# Patient Record
Sex: FEMALE | Race: OTHER | NOT HISPANIC OR LATINO
[De-identification: names, ages, dates, MRNs, and addresses within clinical notes are randomized per-mention and may not be internally consistent; named-entity substitution may affect disease eponyms.]

---

## 2019-05-21 ENCOUNTER — APPOINTMENT (OUTPATIENT)
Dept: ORTHOPEDIC SURGERY | Facility: CLINIC | Age: 64
End: 2019-05-21
Payer: MEDICAID

## 2019-05-21 VITALS
HEART RATE: 84 BPM | WEIGHT: 140 LBS | SYSTOLIC BLOOD PRESSURE: 138 MMHG | HEIGHT: 62 IN | DIASTOLIC BLOOD PRESSURE: 73 MMHG | BODY MASS INDEX: 25.76 KG/M2

## 2019-05-21 PROCEDURE — 99203 OFFICE O/P NEW LOW 30 MIN: CPT

## 2019-05-21 RX ORDER — DICLOFENAC POTASSIUM 50 MG/1
TABLET, COATED ORAL
Refills: 0 | Status: ACTIVE | COMMUNITY

## 2019-05-21 RX ORDER — PREGABALIN 300 MG/1
CAPSULE ORAL
Refills: 0 | Status: ACTIVE | COMMUNITY

## 2019-08-14 ENCOUNTER — OUTPATIENT (OUTPATIENT)
Dept: OUTPATIENT SERVICES | Facility: HOSPITAL | Age: 64
LOS: 1 days | End: 2019-08-14
Payer: MEDICARE

## 2019-08-14 VITALS
OXYGEN SATURATION: 98 % | WEIGHT: 143.08 LBS | DIASTOLIC BLOOD PRESSURE: 70 MMHG | HEART RATE: 72 BPM | RESPIRATION RATE: 14 BRPM | TEMPERATURE: 98 F | SYSTOLIC BLOOD PRESSURE: 110 MMHG | HEIGHT: 62 IN

## 2019-08-14 DIAGNOSIS — R22.31 LOCALIZED SWELLING, MASS AND LUMP, RIGHT UPPER LIMB: ICD-10-CM

## 2019-08-14 DIAGNOSIS — Z98.890 OTHER SPECIFIED POSTPROCEDURAL STATES: Chronic | ICD-10-CM

## 2019-08-14 DIAGNOSIS — Z90.710 ACQUIRED ABSENCE OF BOTH CERVIX AND UTERUS: Chronic | ICD-10-CM

## 2019-08-14 DIAGNOSIS — Z91.89 OTHER SPECIFIED PERSONAL RISK FACTORS, NOT ELSEWHERE CLASSIFIED: ICD-10-CM

## 2019-08-14 DIAGNOSIS — R22.42 LOCALIZED SWELLING, MASS AND LUMP, LEFT LOWER LIMB: ICD-10-CM

## 2019-08-14 DIAGNOSIS — R06.89 OTHER ABNORMALITIES OF BREATHING: ICD-10-CM

## 2019-08-14 DIAGNOSIS — Z98.89 OTHER SPECIFIED POSTPROCEDURAL STATES: Chronic | ICD-10-CM

## 2019-08-14 LAB
ANION GAP SERPL CALC-SCNC: 10 MMO/L — SIGNIFICANT CHANGE UP (ref 7–14)
BUN SERPL-MCNC: 25 MG/DL — HIGH (ref 7–23)
CALCIUM SERPL-MCNC: 9.6 MG/DL — SIGNIFICANT CHANGE UP (ref 8.4–10.5)
CHLORIDE SERPL-SCNC: 105 MMOL/L — SIGNIFICANT CHANGE UP (ref 98–107)
CO2 SERPL-SCNC: 26 MMOL/L — SIGNIFICANT CHANGE UP (ref 22–31)
CREAT SERPL-MCNC: 1.04 MG/DL — SIGNIFICANT CHANGE UP (ref 0.5–1.3)
GLUCOSE SERPL-MCNC: 80 MG/DL — SIGNIFICANT CHANGE UP (ref 70–99)
HCT VFR BLD CALC: 38.3 % — SIGNIFICANT CHANGE UP (ref 34.5–45)
HGB BLD-MCNC: 11.9 G/DL — SIGNIFICANT CHANGE UP (ref 11.5–15.5)
MCHC RBC-ENTMCNC: 30.7 PG — SIGNIFICANT CHANGE UP (ref 27–34)
MCHC RBC-ENTMCNC: 31.1 % — LOW (ref 32–36)
MCV RBC AUTO: 99 FL — SIGNIFICANT CHANGE UP (ref 80–100)
NRBC # FLD: 0 K/UL — SIGNIFICANT CHANGE UP (ref 0–0)
PLATELET # BLD AUTO: 241 K/UL — SIGNIFICANT CHANGE UP (ref 150–400)
PMV BLD: 11.9 FL — SIGNIFICANT CHANGE UP (ref 7–13)
POTASSIUM SERPL-MCNC: 4.1 MMOL/L — SIGNIFICANT CHANGE UP (ref 3.5–5.3)
POTASSIUM SERPL-SCNC: 4.1 MMOL/L — SIGNIFICANT CHANGE UP (ref 3.5–5.3)
RBC # BLD: 3.87 M/UL — SIGNIFICANT CHANGE UP (ref 3.8–5.2)
RBC # FLD: 12.2 % — SIGNIFICANT CHANGE UP (ref 10.3–14.5)
SODIUM SERPL-SCNC: 141 MMOL/L — SIGNIFICANT CHANGE UP (ref 135–145)
WBC # BLD: 7.99 K/UL — SIGNIFICANT CHANGE UP (ref 3.8–10.5)
WBC # FLD AUTO: 7.99 K/UL — SIGNIFICANT CHANGE UP (ref 3.8–10.5)

## 2019-08-14 PROCEDURE — 93010 ELECTROCARDIOGRAM REPORT: CPT

## 2019-08-14 NOTE — H&P PST ADULT - NEGATIVE CARDIOVASCULAR SYMPTOMS
no palpitations/no dyspnea on exertion/no chest pain no peripheral edema/no dyspnea on exertion/no palpitations/no chest pain

## 2019-08-14 NOTE — H&P PST ADULT - NEGATIVE GENERAL SYMPTOMS
no chills/no weight gain/no sweating/no weight loss/no fever no weight loss/no fatigue/no chills/no fever/no weight gain/no sweating

## 2019-08-14 NOTE — H&P PST ADULT - NSICDXPASTMEDICALHX_GEN_ALL_CORE_FT
PAST MEDICAL HISTORY:  Constipation     Fibromyalgia     Language barrier     Localized swelling, mass and lump, right upper limb right middle finger    Lower back pain     Sciatica

## 2019-08-14 NOTE — H&P PST ADULT - NEGATIVE NEUROLOGICAL SYMPTOMS
no difficulty walking/no hemiparesis/no facial palsy/no focal seizures/no tremors/no vertigo/no syncope/no headache/no confusion/no paresthesias/no transient paralysis/no weakness/no generalized seizures/no loss of sensation

## 2019-08-14 NOTE — H&P PST ADULT - NSICDXPASTSURGICALHX_GEN_ALL_CORE_FT
PAST SURGICAL HISTORY:  H/O breast biopsy     H/O carpal tunnel repair bilateral    S/P  section x3    S/P excision of lipoma under right axilla    S/P hysterectomy

## 2019-08-14 NOTE — H&P PST ADULT - MUSCULOSKELETAL COMMENTS
dx of localized swelling, mass and lump, right upper limb. see HPI non tender mass to right middle finger non-tender firm mass to right middle finger

## 2019-08-14 NOTE — H&P PST ADULT - NSICDXPROBLEM_GEN_ALL_CORE_FT
PROBLEM DIAGNOSES  Problem: Localized swelling, mass and lump, left lower limb  Assessment and Plan: Scheduled for Right Middle Finger Dorsal Mass Excision on 8/22/2019.   Preop instructions given via Italian speaking - pt verbalized understanding   GI prophylaxis and chlorhexidine wash with instructions given- pt verbalized understanding using teach back PROBLEM DIAGNOSES  Problem: Localized swelling, mass and lump, left lower limb  Assessment and Plan: Scheduled for Right Middle Finger Dorsal Mass Excision on 8/22/2019.   Preop instructions given via Ugandan speaking - pt verbalized understanding   GI prophylaxis and chlorhexidine wash with instructions given- pt verbalized understanding using teach back     Problem: Ineffective breathing pattern  Assessment and Plan: Pt reports difficulty taking a full deep breath- new onset  Medical clearance requested

## 2019-08-14 NOTE — H&P PST ADULT - NSANTHOSAYNRD_GEN_A_CORE
No. JOSE DANIEL screening performed.  STOP BANG Legend: 0-2 = LOW Risk; 3-4 = INTERMEDIATE Risk; 5-8 = HIGH Risk

## 2019-08-14 NOTE — H&P PST ADULT - HISTORY OF PRESENT ILLNESS
65 y/o Turkish speaking female presents to PST for preoperative evaluation with dx of localized swelling, mass and lump, right finger. Pt reports painless mass to right middle finger two years ago. Scheduled for Right Middle Finger Dorsal Mass Excision on 8/22/2019.

## 2019-08-14 NOTE — H&P PST ADULT - RS GEN PE MLT RESP DETAILS PC
no chest wall tenderness/respirations non-labored/clear to auscultation bilaterally/good air movement/airway patent/breath sounds equal

## 2019-08-14 NOTE — H&P PST ADULT - SYMPTOMS
syncope/none/"sometimes I have difficulty taking a deep breath but I am still able to complete my activities" New onset. PCP not aware of symptoms

## 2019-08-21 ENCOUNTER — TRANSCRIPTION ENCOUNTER (OUTPATIENT)
Age: 64
End: 2019-08-21

## 2019-08-22 ENCOUNTER — OUTPATIENT (OUTPATIENT)
Dept: OUTPATIENT SERVICES | Facility: HOSPITAL | Age: 64
LOS: 1 days | Discharge: HOME | End: 2019-08-22
Payer: MEDICARE

## 2019-08-22 ENCOUNTER — APPOINTMENT (OUTPATIENT)
Dept: ORTHOPEDIC SURGERY | Facility: AMBULATORY SURGERY CENTER | Age: 64
End: 2019-08-22

## 2019-08-22 ENCOUNTER — RESULT REVIEW (OUTPATIENT)
Age: 64
End: 2019-08-22

## 2019-08-22 VITALS
OXYGEN SATURATION: 99 % | HEART RATE: 69 BPM | DIASTOLIC BLOOD PRESSURE: 68 MMHG | HEIGHT: 62 IN | RESPIRATION RATE: 14 BRPM | SYSTOLIC BLOOD PRESSURE: 111 MMHG | WEIGHT: 143.08 LBS | TEMPERATURE: 98 F

## 2019-08-22 VITALS
OXYGEN SATURATION: 100 % | HEART RATE: 64 BPM | SYSTOLIC BLOOD PRESSURE: 112 MMHG | DIASTOLIC BLOOD PRESSURE: 68 MMHG | RESPIRATION RATE: 16 BRPM | TEMPERATURE: 98 F

## 2019-08-22 DIAGNOSIS — Z98.89 OTHER SPECIFIED POSTPROCEDURAL STATES: Chronic | ICD-10-CM

## 2019-08-22 DIAGNOSIS — Z90.710 ACQUIRED ABSENCE OF BOTH CERVIX AND UTERUS: Chronic | ICD-10-CM

## 2019-08-22 DIAGNOSIS — Z98.890 OTHER SPECIFIED POSTPROCEDURAL STATES: Chronic | ICD-10-CM

## 2019-08-22 DIAGNOSIS — R22.31 LOCALIZED SWELLING, MASS AND LUMP, RIGHT UPPER LIMB: ICD-10-CM

## 2019-08-22 PROCEDURE — 88305 TISSUE EXAM BY PATHOLOGIST: CPT | Mod: 26

## 2019-08-22 PROCEDURE — 26116 EXC HAND TUM DEEP < 1.5 CM: CPT | Mod: F7

## 2019-08-22 RX ORDER — DICLOFENAC SODIUM 75 MG/1
1 TABLET, DELAYED RELEASE ORAL
Qty: 0 | Refills: 0 | DISCHARGE

## 2019-08-22 NOTE — BRIEF OPERATIVE NOTE - NSICDXBRIEFPROCEDURE_GEN_ALL_CORE_FT
PROCEDURES:  Surgical removal of cyst of finger of right hand 22-Aug-2019 17:17:45  Jermaine Siddiqi

## 2019-08-22 NOTE — ASU DISCHARGE PLAN (ADULT/PEDIATRIC) - CARE PROVIDER_API CALL
Naz Hilario (MD; MPH)  Orthopaedic Surgery  [address], [address]  [place] [address]  Phone: [phone]  Fax: [phone]  Follow Up Time: 2 weeks

## 2019-09-03 ENCOUNTER — APPOINTMENT (OUTPATIENT)
Dept: ORTHOPEDIC SURGERY | Facility: CLINIC | Age: 64
End: 2019-09-03
Payer: MEDICARE

## 2019-09-03 DIAGNOSIS — R22.31 LOCALIZED SWELLING, MASS AND LUMP, RIGHT UPPER LIMB: ICD-10-CM

## 2019-09-03 PROCEDURE — 99024 POSTOP FOLLOW-UP VISIT: CPT

## 2019-10-30 PROBLEM — M54.30 SCIATICA, UNSPECIFIED SIDE: Chronic | Status: ACTIVE | Noted: 2019-08-14

## 2019-10-30 PROBLEM — M54.5 LOW BACK PAIN: Chronic | Status: ACTIVE | Noted: 2019-08-14

## 2019-10-30 PROBLEM — Z78.9 OTHER SPECIFIED HEALTH STATUS: Chronic | Status: ACTIVE | Noted: 2019-08-14

## 2019-10-30 PROBLEM — R22.31 LOCALIZED SWELLING, MASS AND LUMP, RIGHT UPPER LIMB: Chronic | Status: ACTIVE | Noted: 2019-08-14

## 2019-10-31 ENCOUNTER — APPOINTMENT (OUTPATIENT)
Dept: OTOLARYNGOLOGY | Facility: CLINIC | Age: 64
End: 2019-10-31
Payer: MEDICARE

## 2019-10-31 VITALS
OXYGEN SATURATION: 98 % | DIASTOLIC BLOOD PRESSURE: 73 MMHG | SYSTOLIC BLOOD PRESSURE: 119 MMHG | HEART RATE: 68 BPM | TEMPERATURE: 98.8 F

## 2019-10-31 DIAGNOSIS — R59.0 LOCALIZED ENLARGED LYMPH NODES: ICD-10-CM

## 2019-10-31 DIAGNOSIS — R07.0 PAIN IN THROAT: ICD-10-CM

## 2019-10-31 DIAGNOSIS — R60.9 EDEMA, UNSPECIFIED: ICD-10-CM

## 2019-10-31 PROCEDURE — 99203 OFFICE O/P NEW LOW 30 MIN: CPT | Mod: 25

## 2019-10-31 PROCEDURE — 31575 DIAGNOSTIC LARYNGOSCOPY: CPT

## 2019-11-01 RX ORDER — MELOXICAM 15 MG/1
15 TABLET ORAL DAILY
Qty: 30 | Refills: 1 | Status: COMPLETED | COMMUNITY
Start: 2019-05-21 | End: 2019-11-01

## 2019-11-01 NOTE — HISTORY OF PRESENT ILLNESS
[de-identified] : 64 years old female patient with history of Off and on Throat pain for the past 8 years.   Patient is present today in the office at the request of Dr. Stafford for consultation and evaluation of  most recent Salabilities episode  since 10/01/2019.  Patient states that she experiences dizziness and itchy ears during Sialadenitis episodes. Mid Anterior sublingual area tender to touch.  Tenderness at Submaxillary gland > on the left side.  Post nasal discharge.  Pharyngitis

## 2019-11-01 NOTE — REASON FOR VISIT
[Initial Consultation] : an initial consultation for [FreeTextEntry2] : Off and on Throat pain for the past 8 years.  Patient states her level of severity is a level 8 out of 10 and it occurs constant.  Patient states nothing helps to improve or worsens her Off and on Throat pain for the past 8 years.

## 2019-11-01 NOTE — CONSULT LETTER
[Dear  ___] : Dear  [unfilled], [Consult Letter:] : I had the pleasure of evaluating your patient, [unfilled]. [Please see my note below.] : Please see my note below. [Consult Closing:] : Thank you very much for allowing me to participate in the care of this patient.  If you have any questions, please do not hesitate to contact me. [Sincerely,] : Sincerely, [DrShanti  ___] : Dr. ENCISO [FreeTextEntry3] : Taqueria Horvath MD, FACS\par Professor of Otolaryngology, [facility] [employer] at [facility]\par Director, [facility], [facility] [place]\par , Head & Neck Service Line, [facility]\par

## 2019-11-01 NOTE — PROCEDURE
[Image(s) Captured] : image(s) captured and filed [Topical Lidocaine] : topical lidocaine [Flexible Endoscope] : examined with the flexible endoscope [Serial Number: ___] : Serial Number: [unfilled] [de-identified] :   Tenderness at Submaxillary gland > on the left side.  Post nasal discharge.  Pharyngitis

## 2019-11-07 ENCOUNTER — APPOINTMENT (OUTPATIENT)
Dept: CT IMAGING | Facility: IMAGING CENTER | Age: 64
End: 2019-11-07
Payer: MEDICARE

## 2019-11-07 ENCOUNTER — OUTPATIENT (OUTPATIENT)
Dept: OUTPATIENT SERVICES | Facility: HOSPITAL | Age: 64
LOS: 1 days | End: 2019-11-07
Payer: COMMERCIAL

## 2019-11-07 DIAGNOSIS — Z90.710 ACQUIRED ABSENCE OF BOTH CERVIX AND UTERUS: Chronic | ICD-10-CM

## 2019-11-07 DIAGNOSIS — R59.0 LOCALIZED ENLARGED LYMPH NODES: ICD-10-CM

## 2019-11-07 DIAGNOSIS — Z98.890 OTHER SPECIFIED POSTPROCEDURAL STATES: Chronic | ICD-10-CM

## 2019-11-07 DIAGNOSIS — R07.0 PAIN IN THROAT: ICD-10-CM

## 2019-11-07 DIAGNOSIS — Z98.89 OTHER SPECIFIED POSTPROCEDURAL STATES: Chronic | ICD-10-CM

## 2019-11-07 PROCEDURE — 70491 CT SOFT TISSUE NECK W/DYE: CPT

## 2019-11-07 PROCEDURE — 70491 CT SOFT TISSUE NECK W/DYE: CPT | Mod: 26

## 2019-11-14 ENCOUNTER — APPOINTMENT (OUTPATIENT)
Dept: OTOLARYNGOLOGY | Facility: CLINIC | Age: 64
End: 2019-11-14

## 2019-11-25 ENCOUNTER — APPOINTMENT (OUTPATIENT)
Dept: OTOLARYNGOLOGY | Facility: CLINIC | Age: 64
End: 2019-11-25
Payer: MEDICARE

## 2019-11-25 VITALS
RESPIRATION RATE: 20 BRPM | DIASTOLIC BLOOD PRESSURE: 72 MMHG | OXYGEN SATURATION: 98 % | HEART RATE: 83 BPM | TEMPERATURE: 98.2 F | SYSTOLIC BLOOD PRESSURE: 124 MMHG

## 2019-11-25 PROCEDURE — 31575 DIAGNOSTIC LARYNGOSCOPY: CPT

## 2019-11-25 PROCEDURE — 99213 OFFICE O/P EST LOW 20 MIN: CPT | Mod: 25

## 2019-11-25 RX ORDER — IBUPROFEN 800 MG/1
800 TABLET, FILM COATED ORAL 3 TIMES DAILY
Qty: 1 | Refills: 6 | Status: ACTIVE | COMMUNITY
Start: 2019-11-25 | End: 1900-01-01

## 2019-11-25 RX ORDER — AMOXICILLIN AND CLAVULANATE POTASSIUM 875; 125 MG/1; MG/1
875-125 TABLET, COATED ORAL
Qty: 20 | Refills: 0 | Status: ACTIVE | COMMUNITY
Start: 2019-11-25 | End: 1900-01-01

## 2019-11-25 NOTE — CONSULT LETTER
[Please see my note below.] : Please see my note below. [Sincerely,] : Sincerely, [Consult Closing:] : Thank you very much for allowing me to participate in the care of this patient.  If you have any questions, please do not hesitate to contact me. [DrShanti  ___] : Dr. ENCISO [FreeTextEntry3] : Taqueria Horvath MD, FACS\par Professor of Otolaryngology, [facility] [employer] at [facility]\par Director, [facility], [facility] [place]\par , Head & Neck Service Line, [facility]\par

## 2019-11-25 NOTE — REASON FOR VISIT
[Subsequent Evaluation] : a subsequent evaluation for [FreeTextEntry2] : Mid Anterior sublingual area tender to touch.  Tenderness at Submaxillary gland > on the left side.  Post nasal discharge.  Pharyngitis

## 2019-11-25 NOTE — HISTORY OF PRESENT ILLNESS
[de-identified] : 64 years old female patient with history of Mid Anterior sublingual area tender to touch.  Tenderness at Submaxillary gland > on the left side.  Post nasal discharge.  Pharyngitis    Patient is present today in the office with persistent Sialadenitis episodes. Mid Anterior sublingual area tender to touch.  Tenderness at Submaxillary gland > on the left side.  Post nasal discharge.  Pharyngitis

## 2019-11-25 NOTE — PROCEDURE
[Image(s) Captured] : image(s) captured and filed [Topical Lidocaine] : topical lidocaine [Flexible Endoscope] : examined with the flexible endoscope [Serial Number: ___] : Serial Number: [unfilled] [de-identified] :   Tenderness at Submaxillary gland > on the left side.  Post nasal discharge.  Pharyngitis

## 2020-11-03 ENCOUNTER — EMERGENCY (EMERGENCY)
Facility: HOSPITAL | Age: 65
LOS: 1 days | Discharge: HOME | End: 2020-11-03
Attending: STUDENT IN AN ORGANIZED HEALTH CARE EDUCATION/TRAINING PROGRAM | Admitting: EMERGENCY MEDICINE
Payer: MEDICARE

## 2020-11-03 VITALS
OXYGEN SATURATION: 98 % | SYSTOLIC BLOOD PRESSURE: 115 MMHG | RESPIRATION RATE: 18 BRPM | TEMPERATURE: 97 F | HEIGHT: 62 IN | HEART RATE: 82 BPM | DIASTOLIC BLOOD PRESSURE: 56 MMHG

## 2020-11-03 VITALS
SYSTOLIC BLOOD PRESSURE: 133 MMHG | DIASTOLIC BLOOD PRESSURE: 64 MMHG | TEMPERATURE: 98 F | OXYGEN SATURATION: 98 % | HEART RATE: 75 BPM | RESPIRATION RATE: 17 BRPM

## 2020-11-03 DIAGNOSIS — Z98.890 OTHER SPECIFIED POSTPROCEDURAL STATES: Chronic | ICD-10-CM

## 2020-11-03 DIAGNOSIS — Z98.89 OTHER SPECIFIED POSTPROCEDURAL STATES: Chronic | ICD-10-CM

## 2020-11-03 DIAGNOSIS — Z90.710 ACQUIRED ABSENCE OF BOTH CERVIX AND UTERUS: Chronic | ICD-10-CM

## 2020-11-03 LAB
ALBUMIN SERPL ELPH-MCNC: 4.4 G/DL — SIGNIFICANT CHANGE UP (ref 3.3–5)
ALP SERPL-CCNC: 43 U/L — SIGNIFICANT CHANGE UP (ref 40–120)
ALT FLD-CCNC: 19 U/L — SIGNIFICANT CHANGE UP (ref 4–33)
ANION GAP SERPL CALC-SCNC: 11 MMO/L — SIGNIFICANT CHANGE UP (ref 7–14)
AST SERPL-CCNC: 24 U/L — SIGNIFICANT CHANGE UP (ref 4–32)
BASOPHILS # BLD AUTO: 0.02 K/UL — SIGNIFICANT CHANGE UP (ref 0–0.2)
BASOPHILS NFR BLD AUTO: 0.3 % — SIGNIFICANT CHANGE UP (ref 0–2)
BILIRUB SERPL-MCNC: 0.3 MG/DL — SIGNIFICANT CHANGE UP (ref 0.2–1.2)
BUN SERPL-MCNC: 16 MG/DL — SIGNIFICANT CHANGE UP (ref 7–23)
CALCIUM SERPL-MCNC: 10 MG/DL — SIGNIFICANT CHANGE UP (ref 8.4–10.5)
CHLORIDE SERPL-SCNC: 102 MMOL/L — SIGNIFICANT CHANGE UP (ref 98–107)
CO2 SERPL-SCNC: 26 MMOL/L — SIGNIFICANT CHANGE UP (ref 22–31)
CREAT SERPL-MCNC: 0.62 MG/DL — SIGNIFICANT CHANGE UP (ref 0.5–1.3)
EOSINOPHIL # BLD AUTO: 0.08 K/UL — SIGNIFICANT CHANGE UP (ref 0–0.5)
EOSINOPHIL NFR BLD AUTO: 1 % — SIGNIFICANT CHANGE UP (ref 0–6)
GLUCOSE SERPL-MCNC: 92 MG/DL — SIGNIFICANT CHANGE UP (ref 70–99)
HCT VFR BLD CALC: 37.9 % — SIGNIFICANT CHANGE UP (ref 34.5–45)
HGB BLD-MCNC: 12.3 G/DL — SIGNIFICANT CHANGE UP (ref 11.5–15.5)
IMM GRANULOCYTES NFR BLD AUTO: 0.3 % — SIGNIFICANT CHANGE UP (ref 0–1.5)
LYMPHOCYTES # BLD AUTO: 2 K/UL — SIGNIFICANT CHANGE UP (ref 1–3.3)
LYMPHOCYTES # BLD AUTO: 25.3 % — SIGNIFICANT CHANGE UP (ref 13–44)
MCHC RBC-ENTMCNC: 31.7 PG — SIGNIFICANT CHANGE UP (ref 27–34)
MCHC RBC-ENTMCNC: 32.5 % — SIGNIFICANT CHANGE UP (ref 32–36)
MCV RBC AUTO: 97.7 FL — SIGNIFICANT CHANGE UP (ref 80–100)
MONOCYTES # BLD AUTO: 0.68 K/UL — SIGNIFICANT CHANGE UP (ref 0–0.9)
MONOCYTES NFR BLD AUTO: 8.6 % — SIGNIFICANT CHANGE UP (ref 2–14)
NEUTROPHILS # BLD AUTO: 5.09 K/UL — SIGNIFICANT CHANGE UP (ref 1.8–7.4)
NEUTROPHILS NFR BLD AUTO: 64.5 % — SIGNIFICANT CHANGE UP (ref 43–77)
NRBC # FLD: 0 K/UL — SIGNIFICANT CHANGE UP (ref 0–0)
PLATELET # BLD AUTO: 223 K/UL — SIGNIFICANT CHANGE UP (ref 150–400)
PMV BLD: 10.8 FL — SIGNIFICANT CHANGE UP (ref 7–13)
POTASSIUM SERPL-MCNC: 4.1 MMOL/L — SIGNIFICANT CHANGE UP (ref 3.5–5.3)
POTASSIUM SERPL-SCNC: 4.1 MMOL/L — SIGNIFICANT CHANGE UP (ref 3.5–5.3)
PROT SERPL-MCNC: 7.5 G/DL — SIGNIFICANT CHANGE UP (ref 6–8.3)
RBC # BLD: 3.88 M/UL — SIGNIFICANT CHANGE UP (ref 3.8–5.2)
RBC # FLD: 12.2 % — SIGNIFICANT CHANGE UP (ref 10.3–14.5)
SODIUM SERPL-SCNC: 139 MMOL/L — SIGNIFICANT CHANGE UP (ref 135–145)
WBC # BLD: 7.89 K/UL — SIGNIFICANT CHANGE UP (ref 3.8–10.5)
WBC # FLD AUTO: 7.89 K/UL — SIGNIFICANT CHANGE UP (ref 3.8–10.5)

## 2020-11-03 PROCEDURE — 99284 EMERGENCY DEPT VISIT MOD MDM: CPT

## 2020-11-03 PROCEDURE — 70496 CT ANGIOGRAPHY HEAD: CPT | Mod: 26

## 2020-11-03 PROCEDURE — 70498 CT ANGIOGRAPHY NECK: CPT | Mod: 26

## 2020-11-03 RX ORDER — MECLIZINE HCL 12.5 MG
50 TABLET ORAL ONCE
Refills: 0 | Status: COMPLETED | OUTPATIENT
Start: 2020-11-03 | End: 2020-11-03

## 2020-11-03 RX ORDER — KETOROLAC TROMETHAMINE 30 MG/ML
15 SYRINGE (ML) INJECTION ONCE
Refills: 0 | Status: DISCONTINUED | OUTPATIENT
Start: 2020-11-03 | End: 2020-11-03

## 2020-11-03 RX ORDER — METOCLOPRAMIDE HCL 10 MG
10 TABLET ORAL ONCE
Refills: 0 | Status: COMPLETED | OUTPATIENT
Start: 2020-11-03 | End: 2020-11-03

## 2020-11-03 RX ORDER — ACETAMINOPHEN 500 MG
650 TABLET ORAL ONCE
Refills: 0 | Status: COMPLETED | OUTPATIENT
Start: 2020-11-03 | End: 2020-11-03

## 2020-11-03 RX ORDER — LIDOCAINE 4 G/100G
1 CREAM TOPICAL ONCE
Refills: 0 | Status: COMPLETED | OUTPATIENT
Start: 2020-11-03 | End: 2020-11-03

## 2020-11-03 RX ADMIN — Medication 50 MILLIGRAM(S): at 19:06

## 2020-11-03 RX ADMIN — Medication 650 MILLIGRAM(S): at 19:06

## 2020-11-03 RX ADMIN — Medication 15 MILLIGRAM(S): at 16:30

## 2020-11-03 RX ADMIN — LIDOCAINE 1 PATCH: 4 CREAM TOPICAL at 19:06

## 2020-11-03 RX ADMIN — Medication 10 MILLIGRAM(S): at 16:30

## 2020-11-03 NOTE — ED PROVIDER NOTE - NSFOLLOWUPINSTRUCTIONS_ED_ALL_ED_FT
- Seguimiento con neurología dentro de 3-5 días (joseluis lista adjunta)  - Continúe con meclizina 25 mg cada 8 horas según sea necesario para los síntomas  - Mantente dakotah hidratado  - Continúe con lyrica y baclofeno para el dolor de piernas  - Regrese a la eva de emergencias si experimenta un empeoramiento de los mareos / aturdimiento, dolor de bola, cambios en la visión, debilidad / entumecimiento / hormigueo, cualquier otra cosa que le preocupe

## 2020-11-03 NOTE — ED PROVIDER NOTE - PROGRESS NOTE DETAILS
DIXON Covarrubias: Pt seen ambulating in ED, slowly due to leg pain DIXON Covarrubias: Pt continues to have dizziness and left leg pain, ordered for meclizine, tylenol and lidoderm patch, pending CTa results Juarez: Pt feeling better. Dizziness improved. CTA head and neck with no acute findings. Ambulating without difficulty.

## 2020-11-03 NOTE — ED PROVIDER NOTE - ATTENDING CONTRIBUTION TO CARE
65F h/o fibromyalgia, arthritis, sciatica, vertigo p/w dizziness and L hip/upper leg pain x 1 wk. Describes as room spinning sensation, made worse with positional changes and similar to previous episodes of vertigo. Took meclizine at home without relief. Also c/o pain to L hip unchanged from baseline. Sees pain specialist and prescribed lyrica, baclofen and gets regular PT. +headache. No cp, sob, abd pain, n/v/d. Reports LLE weakness due to pain, no other reported neuro symptoms.    ID [id number]  Gen: nad  HEENt: PERRL, no nystagmus, EOMI  CV: rrr, no murmur  Pulm: clear lungs  Abd: soft, nt, nd  Skin: abrasion to L forerarm, distal ulnar side and abrasion to L upper forehead after bumping head on car yesterday  Neuro: CN2-12 grossly intact, motor 5/5 all extremities, sensation grossly intact  MDM: 65F h/o fibromyalgia, arthritis, sciatica, vertigo p/w dizziness and L hip/upper leg pain x 1 wk - LLE pain 2/2 fibromyalgia vs sciatica vs MSK, unchanged from baseline, will give toradol for pain; dizziness c/w previous vertigo though now not relieved with meclizine - will get CTA head and neck, reglan and IVF for HA, check basic labs and reassess; dispo pending w/u

## 2020-11-03 NOTE — ED PROVIDER NOTE - CLINICAL SUMMARY MEDICAL DECISION MAKING FREE TEXT BOX
65yF w/pmhx fibromyalgia, arthritis, sciatica, vertigo presenting with dizziness and left hip/upper leg pain x 1 week. 65yF w/pmhx fibromyalgia, arthritis, sciatica, vertigo presenting with dizziness and left hip/upper leg pain x 1 week. Given complaints of "softness" to left side of face and arm in setting of dizziness will get CTa head and neck to r/o vascular pathology although likely BPPV. Pt with acute on chronic left hip/leg pain. Plan: cbc/cmp, CTa head and neck, trial meclizine and reglan.

## 2020-11-03 NOTE — ED PROVIDER NOTE - OBJECTIVE STATEMENT
65yF w/pmhx fibromyalgia, arthritis, sciatica, vertigo presenting with dizziness and left hip/upper leg pain x 1 week. Pt reports she feels dizzy daily, described as room spinning, worse with positional change. She states this dizziness feels similar to prior episodes of vertigo, she has been taking meclizine without relief. She reports pain to the left hip and upper thigh for which she has been seeing a pain management doctor and is currently taking baclofen and Lyrica without relief. Additionally pt reports she intermittently feels "a softness" to the left side of her face and left upper arm, unsure if their is weakness. Additionally pt reports headache. Pt denies fever/chills, photophobia, neck pain, difficulty speaking or swallowing, visual changes, chest pain, shortness of breath, abd pain, n/v/d or any other concerns.   ID [id number]

## 2020-11-03 NOTE — ED PROVIDER NOTE - PSH
H/O breast biopsy    H/O carpal tunnel repair  bilateral  S/P  section  x3  S/P excision of lipoma  under right axilla  S/P hysterectomy

## 2020-11-03 NOTE — ED PROVIDER NOTE - PHYSICAL EXAMINATION
Neuro: A&Ox3, PERRL, CN II-VII intact, no nystagmus, sensation intact and equal b/l, strength 5/5 in all extremities, normal rapid alternating movements  FROM of LLE, no bony tenderness

## 2020-11-03 NOTE — ED PROVIDER NOTE - PATIENT PORTAL LINK FT
[facility] Quality Flow/Interdisciplinary Rounds Progress Note        Quality Flow Rounds held on February 24, 2020    Disciplines Attending:  Bedside Nurse, ,  and Nursing Unit Leadership    Robert Olivares was admitted on 2/22/2020  5:36 PM    Anticipated Discharge Date:  Expected Discharge Date: 02/24/20    Disposition:    Rahul Score:  Rahul Scale Score: 19    Readmission Risk              Risk of Unplanned Readmission:        16           Discussed patient goal for the day, patient clinical progression, and barriers to discharge.   The following Goal(s) of the Day/Commitment(s) have been identified:  Other  [facility]  February 24, 2020 You can access the FollowMyHealth Patient Portal offered by [facility] by registering at the following website: [email or web address]. By joining [employer]’s FollowMyHealth portal, you will also be able to view your health information using other applications (apps) compatible with our system.

## 2020-11-03 NOTE — ED ADULT NURSE NOTE - OBJECTIVE STATEMENT
pt received to intake room 4 A&Ox4 ambulatory c/o dizziness x1 week and left arm and leg weakness x3week. pt denies N/V/D, constipation, fever, chills, SOB, Chest pain. pt breathing even and unlabored on room air. no acute distress noted. no swelling noted. +pulses and capillary refill. pending MD evaluation.

## 2020-11-03 NOTE — ED PROVIDER NOTE - PMH
Constipation    Fibromyalgia    Language barrier    Localized swelling, mass and lump, right upper limb  right middle finger  Lower back pain    Sciatica

## 2021-01-13 ENCOUNTER — APPOINTMENT (OUTPATIENT)
Dept: PAIN MANAGEMENT | Facility: CLINIC | Age: 66
End: 2021-01-13
Payer: MEDICARE

## 2021-01-13 VITALS
WEIGHT: 145 LBS | HEIGHT: 62 IN | HEART RATE: 92 BPM | DIASTOLIC BLOOD PRESSURE: 65 MMHG | SYSTOLIC BLOOD PRESSURE: 108 MMHG | BODY MASS INDEX: 26.68 KG/M2

## 2021-01-13 VITALS — TEMPERATURE: 97.2 F

## 2021-01-13 DIAGNOSIS — R42 DIZZINESS AND GIDDINESS: ICD-10-CM

## 2021-01-13 PROCEDURE — 99204 OFFICE O/P NEW MOD 45 MIN: CPT

## 2021-01-13 PROCEDURE — 99072 ADDL SUPL MATRL&STAF TM PHE: CPT

## 2021-01-18 NOTE — PHYSICAL EXAM
[General Appearance - Alert] : alert [General Appearance - In No Acute Distress] : in no acute distress [General Appearance - Well Nourished] : well nourished [General Appearance - Well Developed] : well developed [Oriented To Time, Place, And Person] : oriented to person, place, and time [Impaired Insight] : insight and judgment were intact [Affect] : the affect was normal [Mood] : the mood was normal [Person] : oriented to person [Place] : oriented to place [Time] : oriented to time [Cranial Nerves Oculomotor (III)] : extraocular motion intact [Motor Tone] : muscle tone was normal in all four extremities [Motor Strength] : muscle strength was normal in all four extremities [Sensation Tactile Decrease] : light touch was intact [Abnormal Walk] : normal gait [Balance] : balance was intact [2+] : Ankle jerk left 2+ [Sclera] : the sclera and conjunctiva were normal [PERRL With Normal Accommodation] : pupils were equal in size, round, reactive to light, with normal accommodation [Extraocular Movements] : extraocular movements were intact [Full Visual Field] : full visual field [No Spinal Tenderness] : no spinal tenderness [Musculoskeletal - Swelling] : no joint swelling seen [] : no rash [Outer Ear] : the ears and nose were normal in appearance [FreeTextEntry1] : mild tenderness [id number]

## 2021-01-18 NOTE — HISTORY OF PRESENT ILLNESS
[FreeTextEntry1] : 65 y o female is here for an evaluation for vertigo. Pt has been having vertigo for about 4 yrs controlled with Meclizine. Now she has been having positional dizziness for about a month and half, she has been having symptoms about 5-6x/day. Occ ringing in the ears lasting sec. No hearing loss. S/t pain in the forehead 2-3x/wk and everyday pain around the Rt-ear not ass with eating. Pt also has troubles sleeping, s/t she is able to have a good sleep and others she doesn't. ENT eval was WNL. \par Pt is also on Baclofen, Lyrica for pain by Dr. Patrice Joseph and on Diclofenac by Dr. Garcia her PCP. \par MRI-B 2018 and nystagmogram was ng for any peripheral and central 
86.2

## 2021-01-18 NOTE — ASSESSMENT
[FreeTextEntry1] : 65 y.o female with chronic benign positional peripheral vertigo. \par Will obtain MRI B +/- contrast with evaluation of IACs, to r/o mass lesion. \par Will also recommend vestibular therapy. \par

## 2021-01-25 ENCOUNTER — RESULT REVIEW (OUTPATIENT)
Age: 66
End: 2021-01-25

## 2021-01-25 ENCOUNTER — OUTPATIENT (OUTPATIENT)
Dept: OUTPATIENT SERVICES | Facility: HOSPITAL | Age: 66
LOS: 1 days | End: 2021-01-25
Payer: COMMERCIAL

## 2021-01-25 ENCOUNTER — APPOINTMENT (OUTPATIENT)
Dept: MRI IMAGING | Facility: CLINIC | Age: 66
End: 2021-01-25
Payer: MEDICARE

## 2021-01-25 DIAGNOSIS — Z98.890 OTHER SPECIFIED POSTPROCEDURAL STATES: Chronic | ICD-10-CM

## 2021-01-25 DIAGNOSIS — Z90.710 ACQUIRED ABSENCE OF BOTH CERVIX AND UTERUS: Chronic | ICD-10-CM

## 2021-01-25 DIAGNOSIS — R42 DIZZINESS AND GIDDINESS: ICD-10-CM

## 2021-01-25 DIAGNOSIS — Z98.89 OTHER SPECIFIED POSTPROCEDURAL STATES: Chronic | ICD-10-CM

## 2021-01-25 PROCEDURE — 70553 MRI BRAIN STEM W/O & W/DYE: CPT

## 2021-01-25 PROCEDURE — 70553 MRI BRAIN STEM W/O & W/DYE: CPT | Mod: 26

## 2021-01-25 PROCEDURE — A9585: CPT

## 2021-05-24 ENCOUNTER — LABORATORY RESULT (OUTPATIENT)
Age: 66
End: 2021-05-24

## 2021-05-24 ENCOUNTER — APPOINTMENT (OUTPATIENT)
Dept: OBGYN | Facility: CLINIC | Age: 66
End: 2021-05-24
Payer: MEDICARE

## 2021-05-24 VITALS — BODY MASS INDEX: 27.07 KG/M2 | DIASTOLIC BLOOD PRESSURE: 72 MMHG | SYSTOLIC BLOOD PRESSURE: 134 MMHG | WEIGHT: 148 LBS

## 2021-05-24 DIAGNOSIS — B00.1 HERPESVIRAL VESICULAR DERMATITIS: ICD-10-CM

## 2021-05-24 DIAGNOSIS — Z80.41 FAMILY HISTORY OF MALIGNANT NEOPLASM OF OVARY: ICD-10-CM

## 2021-05-24 DIAGNOSIS — Z01.419 ENCOUNTER FOR GYNECOLOGICAL EXAMINATION (GENERAL) (ROUTINE) W/OUT ABNORMAL FINDINGS: ICD-10-CM

## 2021-05-24 DIAGNOSIS — M19.90 UNSPECIFIED OSTEOARTHRITIS, UNSPECIFIED SITE: ICD-10-CM

## 2021-05-24 DIAGNOSIS — Z80.0 FAMILY HISTORY OF MALIGNANT NEOPLASM OF DIGESTIVE ORGANS: ICD-10-CM

## 2021-05-24 PROCEDURE — 99202 OFFICE O/P NEW SF 15 MIN: CPT | Mod: 25

## 2021-05-24 PROCEDURE — 99387 INIT PM E/M NEW PAT 65+ YRS: CPT

## 2021-05-27 PROBLEM — Z80.41 FAMILY HISTORY OF MALIGNANT NEOPLASM OF OVARY: Status: ACTIVE | Noted: 2021-05-24

## 2021-05-27 PROBLEM — Z01.419 WELL WOMAN EXAM WITH ROUTINE GYNECOLOGICAL EXAM: Status: ACTIVE | Noted: 2021-05-27

## 2021-05-27 PROBLEM — Z80.0 FAMILY HISTORY OF MALIGNANT NEOPLASM OF COLON: Status: ACTIVE | Noted: 2021-05-24

## 2021-05-27 PROBLEM — B00.1 HERPES SIMPLEX LABIALIS: Status: ACTIVE | Noted: 2021-05-27

## 2021-05-27 NOTE — PHYSICAL EXAM
[Appropriately responsive] : appropriately responsive [Alert] : alert [No Acute Distress] : no acute distress [No Lymphadenopathy] : no lymphadenopathy [Regular Rate Rhythm] : regular rate rhythm [No Murmurs] : no murmurs [Clear to Auscultation B/L] : clear to auscultation bilaterally [Soft] : soft [Non-tender] : non-tender [Non-distended] : non-distended [No HSM] : No HSM [No Lesions] : no lesions [No Mass] : no mass [Oriented x3] : oriented x3 [Examination Of The Breasts] : a normal appearance [No Masses] : no breast masses were palpable [Multiple] : multiple [Labia Majora] : normal [Labia Minora] : normal [Discharge] : a  ~M vaginal discharge was present [Scant] : scant [Clear] : clear [Normal] : normal [Uterine Adnexae] : normal [Foul Smelling] : not foul smelling

## 2021-06-30 ENCOUNTER — APPOINTMENT (OUTPATIENT)
Dept: DERMATOLOGY | Facility: CLINIC | Age: 66
End: 2021-06-30
Payer: MEDICARE

## 2021-06-30 VITALS — BODY MASS INDEX: 25.27 KG/M2 | WEIGHT: 148 LBS | HEIGHT: 64 IN

## 2021-06-30 DIAGNOSIS — L82.1 OTHER SEBORRHEIC KERATOSIS: ICD-10-CM

## 2021-06-30 DIAGNOSIS — L73.8 OTHER SPECIFIED FOLLICULAR DISORDERS: ICD-10-CM

## 2021-06-30 DIAGNOSIS — L72.0 EPIDERMAL CYST: ICD-10-CM

## 2021-06-30 PROCEDURE — 99203 OFFICE O/P NEW LOW 30 MIN: CPT

## 2021-06-30 RX ORDER — LIDOCAINE AND PRILOCAINE 25; 25 MG/G; MG/G
2.5-2.5 CREAM TOPICAL
Qty: 1 | Refills: 0 | Status: ACTIVE | COMMUNITY
Start: 2021-06-30 | End: 1900-01-01

## 2021-08-17 ENCOUNTER — EMERGENCY (EMERGENCY)
Facility: HOSPITAL | Age: 66
LOS: 1 days | Discharge: HOME | End: 2021-08-17
Attending: EMERGENCY MEDICINE
Payer: COMMERCIAL

## 2021-08-17 VITALS
HEART RATE: 93 BPM | TEMPERATURE: 98 F | WEIGHT: 130.07 LBS | OXYGEN SATURATION: 99 % | RESPIRATION RATE: 18 BRPM | DIASTOLIC BLOOD PRESSURE: 97 MMHG | SYSTOLIC BLOOD PRESSURE: 143 MMHG | HEIGHT: 62 IN

## 2021-08-17 DIAGNOSIS — Z98.890 OTHER SPECIFIED POSTPROCEDURAL STATES: Chronic | ICD-10-CM

## 2021-08-17 DIAGNOSIS — Z98.89 OTHER SPECIFIED POSTPROCEDURAL STATES: Chronic | ICD-10-CM

## 2021-08-17 DIAGNOSIS — Z90.710 ACQUIRED ABSENCE OF BOTH CERVIX AND UTERUS: Chronic | ICD-10-CM

## 2021-08-17 PROCEDURE — 70450 CT HEAD/BRAIN W/O DYE: CPT | Mod: 26,MA

## 2021-08-17 PROCEDURE — 99285 EMERGENCY DEPT VISIT HI MDM: CPT

## 2021-08-17 PROCEDURE — 70450 CT HEAD/BRAIN W/O DYE: CPT | Mod: MA

## 2021-08-17 PROCEDURE — 99284 EMERGENCY DEPT VISIT MOD MDM: CPT | Mod: 25

## 2021-08-17 PROCEDURE — 72125 CT NECK SPINE W/O DYE: CPT | Mod: MA

## 2021-08-17 PROCEDURE — 72125 CT NECK SPINE W/O DYE: CPT | Mod: 26,MA

## 2021-08-17 NOTE — ED ADULT NURSE NOTE - NS_NURSE_DISC_TEACHING_YN_ED_ALL_ED
[No

## 2021-08-17 NOTE — ED PROVIDER NOTE - OBJECTIVE STATEMENT
65yF fibromyalgia, arthritis, sciatica, vertigo presenting s/p fall out of bed, mechanical. Slipped out of edge of bed, fell onto R back head. Was sleeping, getting out of bed, then missed her footing and fell. Has bump on head. Neck pain. Could walk fine afterward, no dizziness. No blood thinners, no asa. Has osteoporosis. No smoking, drinking.

## 2021-08-17 NOTE — ED PROVIDER NOTE - CARE PLAN
1 Principal Discharge DX:	Traumatic injury of head with hematoma of scalp  Secondary Diagnosis:	Accidental fall

## 2021-08-17 NOTE — ED PROVIDER NOTE - NSFOLLOWUPINSTRUCTIONS_ED_ALL_ED_FT
Deberia usted tratar la inflamacion del area donde esta herida la cabaza con yielo, Tylenol, y descanso. Si se nota mas dolor, vista borrosa, desiquilibrio en ordaz abilidad de caminar, nauseas, vomitos, o si pierde ordaz conocimiento, por favor regrese inmedietamente a la eva de emergencias. Deberia programar lio kevan con ordaz doctor primario dentro de 1-3 rojas, y tambian con un neurologo. Le dimos lio referencia para hacer eso.

## 2021-08-17 NOTE — ED PROVIDER NOTE - CLINICAL SUMMARY MEDICAL DECISION MAKING FREE TEXT BOX
65yF fibromyalgia, arthritis, sciatica, vertigo presenting s/p fall out of bed, mechanical. Slipped out of edge of bed, fell onto R back head. Was sleeping, getting out of bed, then missed her footing and fell. Has bump on head. Neck pain. Could walk fine afterward, no dizziness. Will check CT head/neck to r/o fracture/bleed. No other areas of trauma on exam.

## 2021-08-17 NOTE — ED PROVIDER NOTE - PHYSICAL EXAMINATION
PHYSICAL EXAM:  GENERAL: NAD, lying in bed comfortably  HEAD:  traumatic, hematoma R poterior scalp, mildly tender, no bleeding  EYES: EOMI, PERRLA, conjunctiva and sclera clear  ENT: Moist mucous membranes  NECK: Supple, No JVD. Full flexion, extension.  CHEST/LUNG: Clear to auscultation bilaterally; No rales, rhonchi, wheezing, or rubs. Unlabored respirations  HEART: Regular rate and rhythm; No murmurs, rubs, or gallops  ABDOMEN: Bowel sounds present; Soft, Nontender, Nondistended.  EXTREMITIES:  2+ Peripheral Pulses, brisk capillary refill. No clubbing, cyanosis, or edema  NERVOUS SYSTEM:  Alert & Oriented X3, speech clear. No deficits.  MSK: FROM all 4 extremities, full and equal strength. Spinal survey negative for stepoff, crepitus, tenderness.  SKIN: No rashes or lesions

## 2021-08-17 NOTE — ED ADULT NURSE NOTE - NSIMPLEMENTINTERV_GEN_ALL_ED
Implemented All Fall Risk Interventions:  [place] to call system. Call bell, personal items and telephone within reach. Instruct patient to call for assistance. Room bathroom lighting operational. Non-slip footwear when patient is off stretcher. Physically safe environment: no spills, clutter or unnecessary equipment. Stretcher in lowest position, wheels locked, appropriate side rails in place. Provide visual cue, wrist band, yellow gown, etc. Monitor gait and stability. Monitor for mental status changes and reorient to person, place, and time. Review medications for side effects contributing to fall risk. Reinforce activity limits and safety measures with patient and family.

## 2021-08-17 NOTE — ED PROVIDER NOTE - PROGRESS NOTE DETAILS
Ct Noted. Scalp hematoma. No fx. Ambulation extremely stable. Patient amenable to discharge with neuro f/u, pcp f/u.

## 2021-08-17 NOTE — ED PROVIDER NOTE - NSFOLLOWUPCLINICS_GEN_ALL_ED_FT
[facility]  Neurology  [address] [place] [address] [address] [place] [address]  Phone: [phone]  Fax:   Follow Up Time: 1-3 Days

## 2021-08-17 NOTE — ED ADULT NURSE NOTE - OBJECTIVE STATEMENT
65 year old female brought in by EMS following a fell. PMH of fibromyalgia, arthritis, sciatica, vertigo. Coming in today s/p fall out of bed. As per patient she slipped out of edge of bed, fell onto Right side hitting the back of her head. She does have a golf ball size bump in the back right side of her head. She is also complaining of neck pain. Patient states she could walk fine afterward. No dizziness. No blood thinners, no asa. No recent fever, chills, or body aches. No CP, SOB, or difficulty breathing.

## 2021-08-17 NOTE — ED PROVIDER NOTE - ATTENDING CONTRIBUTION TO CARE
[facility], prodromal symptoms of dizziness. pt now endorsing head pain at site of swelling, diffuse neck pain. pt able to ambulate s/p fall without difficulty. no other preceding symptoms such as fever, sob, urinary sx. on exam, pt has scalp occipital hematoma, mild diffuse neck tenderness without midline deformity, no extremity, trunk, back, abd deformity or tenderness. will obtain CT head/neck to r/o traumatic injury. no signs of infection, metabolic derrangement contributed to fall. if neg, pt should be stable to d/c with head injury precautions.

## 2021-08-17 NOTE — ED PROVIDER NOTE - LANGUAGE ASSISTANCE NEEDED
preferred to speak to provider directly in her native language/No-Patient/Caregiver offered and refused free interpretation services.

## 2021-08-17 NOTE — ED PROVIDER NOTE - PATIENT PORTAL LINK FT
You can access the FollowMyHealth Patient Portal offered by [facility] by registering at the following website: [email or web address]. By joining [employer]’s FollowMyHealth portal, you will also be able to view your health information using other applications (apps) compatible with our system.

## 2021-09-15 ENCOUNTER — APPOINTMENT (OUTPATIENT)
Dept: DERMATOLOGY | Facility: CLINIC | Age: 66
End: 2021-09-15

## 2021-12-23 ENCOUNTER — EMERGENCY (EMERGENCY)
Facility: HOSPITAL | Age: 66
LOS: 1 days | Discharge: HOME | End: 2021-12-23
Attending: EMERGENCY MEDICINE | Admitting: EMERGENCY MEDICINE
Payer: MEDICARE

## 2021-12-23 VITALS
HEART RATE: 78 BPM | DIASTOLIC BLOOD PRESSURE: 65 MMHG | OXYGEN SATURATION: 100 % | TEMPERATURE: 99 F | HEIGHT: 62 IN | RESPIRATION RATE: 18 BRPM | SYSTOLIC BLOOD PRESSURE: 139 MMHG

## 2021-12-23 DIAGNOSIS — Z98.89 OTHER SPECIFIED POSTPROCEDURAL STATES: Chronic | ICD-10-CM

## 2021-12-23 DIAGNOSIS — Z98.890 OTHER SPECIFIED POSTPROCEDURAL STATES: Chronic | ICD-10-CM

## 2021-12-23 DIAGNOSIS — Z90.710 ACQUIRED ABSENCE OF BOTH CERVIX AND UTERUS: Chronic | ICD-10-CM

## 2021-12-23 PROCEDURE — 99284 EMERGENCY DEPT VISIT MOD MDM: CPT

## 2021-12-23 RX ORDER — SODIUM CHLORIDE 9 MG/ML
1000 INJECTION INTRAMUSCULAR; INTRAVENOUS; SUBCUTANEOUS ONCE
Refills: 0 | Status: DISCONTINUED | OUTPATIENT
Start: 2021-12-23 | End: 2021-12-23

## 2021-12-23 RX ORDER — BENZOCAINE AND MENTHOL 5; 1 G/100ML; G/100ML
1 LIQUID ORAL ONCE
Refills: 0 | Status: COMPLETED | OUTPATIENT
Start: 2021-12-23 | End: 2021-12-23

## 2021-12-23 RX ORDER — BENZOCAINE AND MENTHOL 5; 1 G/100ML; G/100ML
1 LIQUID ORAL ONCE
Refills: 0 | Status: DISCONTINUED | OUTPATIENT
Start: 2021-12-23 | End: 2021-12-23

## 2021-12-23 RX ORDER — ACETAMINOPHEN 500 MG
650 TABLET ORAL ONCE
Refills: 0 | Status: COMPLETED | OUTPATIENT
Start: 2021-12-23 | End: 2021-12-23

## 2021-12-23 RX ADMIN — Medication 650 MILLIGRAM(S): at 23:38

## 2021-12-23 NOTE — ED PROVIDER NOTE - PHYSICAL EXAMINATION
Gen: Well appearing in NAD  Head: NC/AT  ENT:  Post kathryn clear no erythema  Neck: trachea midline  Resp:  No distress CTAB  CV: RRR  Ext: no deformities  Neuro:  A&O appears non focal  Skin:  Warm and dry as visualized  Psych:  Normal affect and mood

## 2021-12-23 NOTE — ED ADULT TRIAGE NOTE - CHIEF COMPLAINT QUOTE
R/O COVID --- HEADACHE AND CP    pt states niece is covid+... having headache, low grade temp (tmax 100.8), cp, cough.  speaking freely in full sentences.  hx of arthritis, sciatica and disc problems.  speaks mostly Belgian. son- renzo [phone]

## 2021-12-23 NOTE — ED PROVIDER NOTE - OBJECTIVE STATEMENT
Pt is 65 yo with past medical history of fibromyalgia  Presenting with a fever, cough, body aches sore throat over the last 3 days  Symptoms have been getting progressively worse    The patient does/does not have sick contacts at home that are COVID+

## 2021-12-23 NOTE — ED PROVIDER NOTE - CLINICAL SUMMARY MEDICAL DECISION MAKING FREE TEXT BOX
pt presenting with signs and symptoms of COVID-19.  Currently respiratorily stable with good RA saturation as well as no significant desaturation with ambulation.  CxR is showing XX.  VSS otherwise stable as well.  Good candidate for outpatient management.  Detailed instructions regarding self quarantine were provided as well as supportive care at home.  Signs for which to return were also provided. pt presenting with signs and symptoms of COVID-19.  Currently respiratorily stable with good RA saturation as well as no significant desaturation with ambulation.  PT requesting to leave prior to CXR.  VSS otherwise stable as well.  Good candidate for outpatient management.  Detailed instructions regarding self quarantine were provided as well as supportive care at home.  Signs for which to return were also provided.

## 2021-12-23 NOTE — ED PROVIDER NOTE - PROGRESS NOTE DETAILS
Kirill Winters PGY2: Pt requesting to leave prior to CXR. States feels better after medication and would like to leave. Can send covid result to [phone]. Pt at this time stable for discharge. Return precautions given, and all questions answered prior to discharge.

## 2021-12-23 NOTE — ED PROVIDER NOTE - PATIENT PORTAL LINK FT
You can access the FollowMyHealth Patient Portal offered by [facility] by registering at the following website: [email or web address]. By joining [employer]’s FollowMyHealth portal, you will also be able to view your health information using other applications (apps) compatible with our system.

## 2021-12-23 NOTE — ED PROVIDER NOTE - NSFOLLOWUPINSTRUCTIONS_ED_ALL_ED_FT
You have a viral syndrome. This can last from 5-10 days. Alternate Tylenol and Motrin every 4-6 hours for fever control as well as body aches and chills. Drink plenty of fluids. Rest. Return to the emergency room for worsening condition or new concerning symptoms. Follow up with your regular doctor. If you don't have a regular doctor use one of the numbers below to establish a primary care doctor.    A viral infection can be caused by different types of viruses. Most viral infections are not serious and resolve on their own. However, some infections may cause severe symptoms and may lead to further complications.  SYMPTOMS Viruses can frequently cause: Minor sore throat. Aches and pains. Headaches. Runny nose. Different types of rashes. Watery eyes. Tiredness. Cough. Loss of appetite. Gastrointestinal infections, resulting in nausea, vomiting, and diarrhea. These symptoms do not respond to antibiotics because the infection is not caused by bacteria. However, you might catch a bacterial infection following the viral infection. This is sometimes called a "superinfection." Symptoms of such a bacterial infection may include: Worsening sore throat with pus and difficulty swallowing. Swollen neck glands. Chills and a high or persistent fever. Severe headache. Tenderness over the sinuses. Persistent overall ill feeling (malaise), muscle aches, and tiredness (fatigue). Persistent cough. Yellow, green, or brown mucus production with coughing.     HOME CARE INSTRUCTIONS Only take over-the-counter or prescription medicines for pain, discomfort, diarrhea, or fever as directed by your caregiver. Drink enough water and fluids to keep your urine clear or pale yellow. Sports drinks can provide valuable electrolytes, sugars, and hydration. Get plenty of rest and maintain proper nutrition. Soups and broths with crackers or rice are fine.     SEEK IMMEDIATE MEDICAL CARE IF: You have severe headaches, shortness of breath, chest pain, neck pain, or an unusual rash. You have uncontrolled vomiting, diarrhea, or you are unable to keep down fluids.

## 2021-12-24 LAB — SARS-COV-2 RNA SPEC QL NAA+PROBE: DETECTED

## 2021-12-24 RX ADMIN — BENZOCAINE AND MENTHOL 1 LOZENGE: 5; 1 LIQUID ORAL at 00:27

## 2021-12-24 NOTE — ED ADULT NURSE NOTE - OBJECTIVE STATEMENT
Patient A&Ox4 ambulatory c/o fever, cough, body aches sore throat over the last 3 days. Patient states family member is COVID+ and is concern that she has it as well. Respirations even and unlabored. Patient appears comfortable on stretcher. Medicated per orders. Patient swapped sent to lab. Patient requesting to be discharged as soon as possible. Stretcher in lowest position, wheels locked, appropriate side rails in place, call bell in reach.

## 2021-12-24 NOTE — ED ADULT NURSE NOTE - CHIEF COMPLAINT QUOTE
R/O COVID --- HEADACHE AND CP    pt states niece is covid+... having headache, low grade temp (tmax 100.8), cp, cough.  speaking freely in full sentences.  hx of arthritis, sciatica and disc problems.  speaks mostly Grenadian. son- renzo [phone]

## 2021-12-24 NOTE — ED POST DISCHARGE NOTE - ADDITIONAL DOCUMENTATION
Pt called with  to find out COVID-19 test result- it is positive, informed of result and advised o quarantine until 1/1/22.  Pt aware, return precautions given to patient.

## 2022-01-20 ENCOUNTER — EMERGENCY (EMERGENCY)
Facility: HOSPITAL | Age: 67
LOS: 1 days | Discharge: HOME | End: 2022-01-20
Attending: EMERGENCY MEDICINE | Admitting: EMERGENCY MEDICINE
Payer: MEDICARE

## 2022-01-20 VITALS
OXYGEN SATURATION: 98 % | SYSTOLIC BLOOD PRESSURE: 113 MMHG | TEMPERATURE: 98 F | DIASTOLIC BLOOD PRESSURE: 80 MMHG | HEART RATE: 72 BPM | RESPIRATION RATE: 22 BRPM

## 2022-01-20 VITALS
SYSTOLIC BLOOD PRESSURE: 156 MMHG | HEIGHT: 62 IN | DIASTOLIC BLOOD PRESSURE: 100 MMHG | TEMPERATURE: 98 F | OXYGEN SATURATION: 100 % | HEART RATE: 115 BPM | RESPIRATION RATE: 20 BRPM

## 2022-01-20 DIAGNOSIS — Z98.89 OTHER SPECIFIED POSTPROCEDURAL STATES: Chronic | ICD-10-CM

## 2022-01-20 DIAGNOSIS — Z98.890 OTHER SPECIFIED POSTPROCEDURAL STATES: Chronic | ICD-10-CM

## 2022-01-20 DIAGNOSIS — Z90.710 ACQUIRED ABSENCE OF BOTH CERVIX AND UTERUS: Chronic | ICD-10-CM

## 2022-01-20 PROCEDURE — 99284 EMERGENCY DEPT VISIT MOD MDM: CPT

## 2022-01-20 PROCEDURE — 74018 RADEX ABDOMEN 1 VIEW: CPT | Mod: 26

## 2022-01-20 RX ORDER — LIDOCAINE 4 G/100G
1 CREAM TOPICAL ONCE
Refills: 0 | Status: COMPLETED | OUTPATIENT
Start: 2022-01-20 | End: 2022-01-20

## 2022-01-20 RX ORDER — ACETAMINOPHEN 500 MG
975 TABLET ORAL ONCE
Refills: 0 | Status: COMPLETED | OUTPATIENT
Start: 2022-01-20 | End: 2022-01-20

## 2022-01-20 RX ADMIN — LIDOCAINE 1 PATCH: 4 CREAM TOPICAL at 09:13

## 2022-01-20 RX ADMIN — Medication 975 MILLIGRAM(S): at 09:44

## 2022-01-20 RX ADMIN — Medication 975 MILLIGRAM(S): at 07:27

## 2022-01-20 NOTE — ED PROVIDER NOTE - OBJECTIVE STATEMENT
65 yo F with hx of chronic constipation p/w severe constipation for 2 days. States that she has been unable to have a bowel movement since Monday. Reports associated rectal pain. States that she is passing gas today. Patient's GI doctor prescribed lactulose which she has been taking without relief. Denies n/v, abdominal pain, fevers/chills. States that she has used enemas in the past but did not try during this episode.

## 2022-01-20 NOTE — ED PROVIDER NOTE - PATIENT PORTAL LINK FT
You can access the FollowMyHealth Patient Portal offered by [facility] by registering at the following website: [email or web address]. By joining [employer]’s FollowMyHealth portal, you will also be able to view your health information using other applications (apps) compatible with our system.

## 2022-01-20 NOTE — ED ADULT NURSE NOTE - CHIEF COMPLAINT QUOTE
Patient unable to have BM in 2 days.  Complaining of rectum/abdominal pain, constipation and nausea.

## 2022-01-20 NOTE — ED PROVIDER NOTE - NSFOLLOWUPINSTRUCTIONS_ED_ALL_ED_FT
Constipation    Constipation is when a person has fewer than three bowel movements a week, has difficulty having a bowel movement, or has stools that are dry, hard, or larger than normal. Other symptoms can include abdominal pain or bloating. As people grow older, constipation is more common. A low-fiber diet, not taking in enough fluids, and taking certain medicines, including opioid painkillers, may make constipation worse. Treatment varies but may include dietary modifications (more fiber-rich foods), lifestyle modifications, and possible medications.     SEEK IMMEDIATE MEDICAL CARE IF YOU HAVE ANY OF THE FOLLOWING SYMPTOMS: bright red blood in your stool, constipation for longer than 4 days, abdominal or rectal pain, unexplained weight loss, or inability to pass gas.    You had a thorough evaluation including an exam, labs and imaging.  1. You were seen for Constipation. A copy of your resulted labs, imaging, and findings have been provided to you.  2. Read Follow-up Instructions that you have given.  3. Take Tylenol 500 mg (1 or 2 every 6 hours) and/or naproxen 500 mg (1 every 12 hours) for pain.   4. Treatment varies but may include dietary modifications (more fiber-rich foods such as fruits, vegetables, and whole grains), lifestyle modifications (regular exercise, water intake), and possible medications. Add a fiber supplement like Psyllium (aka Metamucil, Konsyl) three times per day. Can double the amount taken three times a day, if needed. You can try a stimulant like Senna (aka Senexon, Senekot) as needed if all the above haven't worked but don't take the stimulant regularly.  5. You should also establish care with Gastroenterology by calling  [phone] to find a doctor affiliated with [facility] in your neighborhood & network. You have given the information of affiliate doctors. Return if symptoms worse, particularly if vomiting or severe pain. Follow up with your primary care doctor within 48 hours. Please call [phone] to make an appointment or with any questions you may have.  or call [phone] to make an appointment with the clinic.  6. Return immediately to the emergency department for new, persistent, or worsening symptoms or signs. Return immediately to the emergency department if you have chest pain, shortness of breath, loss of consciousness, or any other new concerns.

## 2022-01-20 NOTE — ED ADULT TRIAGE NOTE - HEART RATE (BEATS/MIN)
115 TODAY'S VISIT:  You were seen today for laceration  -   - If you had any labs or imaging/radiology tests performed today, you should also discuss these tests with your usual provider.     FOLLOW-UP:  Please make an appointment to follow up with:  - Your Primary Care Provider. If you do not have a PCP, please call the Primary Care Center (phone: [phone] for an appointment    - Have your provider review the results from today's visit with you again to make sure no further follow-up or additional testing is needed based on those results.     PRESCRIPTIONS / MEDICATIONS:  -You may use over-the-counter Tylenol or ibuprofen as needed for pain control at home    OTHER INSTRUCTIONS:  -Applying ice to the affected area several times a day over the next 1 to 2 days may also help reduce pain and swelling  -Please follow up with your PCP for suture removal in 4-5 days. You may also return here or go to any urgent care or ER for suture removal.  Keep the affected area as clean and dry as it is possible.  If one was applied, leave the dressing that was applied in the emergency department in place for 24 hours, after which you may leave the wound uncovered or change the dressing daily or whenever it becomes soiled.  Make sure to keep the wound covered when participating in any activities in which the affected area may become dirty.  Avoid swimming or submerging the affected area in the tub/bath for at least 1 week.  Showering is okay. Return to the emergency department immediately if you develop any signs of infection, including fevers/chills or increasing pain/redness/swelling/warmth/cloudy discharge from around the wound or if bleeding from the wound occurs and cannot be controlled with direct pressure to the wound. You may apply bacitracin to the laceration twice daily. Make sure to use sunscreen in the future whenever sun exposure to the affected area may occur in order to help minimize scarring.  - Return to the  Emergency Department at any time for any new or worsening symptoms or any concerns, particularly any vision changes, severe headaches, nausea with vomiting, new numbness, weakness, tingling in her arms/legs or difficulty with speech or ambulation.    RETURN TO THE EMERGENCY DEPARTMENT  Return to the Emergency Department at any time for any new or worsening symptoms or any concerns.

## 2022-01-20 NOTE — ED PROVIDER NOTE - NSFOLLOWUPCLINICS_GEN_ALL_ED_FT
PRECISE STUDY: spoke with patient on 6- regarding the most recent  ICF for study to be mailed.  Pt. expressed desire to be removed from study. She expressed frustration over not being able to find anything wrong and did not feel she contributed to study.  Pt was thanked for her participation.   
[facility]  Gastroenterology  [address] [place] [id number]  Phone: [phone]  Fax:   Follow Up Time: 7-10 Days

## 2022-01-20 NOTE — ED ADULT NURSE REASSESSMENT NOTE - NS ED NURSE REASSESS COMMENT FT1
Spoke to the pharmacy for smog enema and as per pharmacy they are still preparing it and will send when done. MD Kessler made aware .Patient in no distress. Nursing care continues

## 2022-01-20 NOTE — ED ADULT TRIAGE NOTE - CHIEF COMPLAINT QUOTE
Patient unable to have BM in 2 days.  Complaining of rectum/abdominal pain and nausea. Patient unable to have BM in 2 days.  Complaining of rectum/abdominal pain, constipation and nausea.

## 2022-01-20 NOTE — ED ADULT TRIAGE NOTE - NS ED NURSE AMBULANCES
ANGÉLICA Stephenson Gastro Nurse [facility]             Scheduled Colonoscopy under [facility] on 01/07/2019 for Hx colon cancer/[facility].      Service to family practice was placed today, 11/21/18.    Pt. Was given prep instructions and medications at office visit 11/20/18.    
[facility]

## 2022-01-20 NOTE — ED PROVIDER NOTE - CLINICAL SUMMARY MEDICAL DECISION MAKING FREE TEXT BOX
65 yo F with hx of chronic constipation p/w severe constipation for 2 days. Unable to have a bowel movement for 2 days with rectal fullness/pain. Started passing gas today. Abdomen soft, nontender. Likely functional constipation. Consider bowel obstruction but unlikely given clinical presentation. Plan for abdominal XR to assess for stool burden/air fluid levels. Will attempt enema and reassess.

## 2022-01-20 NOTE — ED PROVIDER NOTE - ATTENDING CONTRIBUTION TO CARE
I performed a face-to-face evaluation of the patient and performed a history and physical examination along with the resident or ACP, and/or medical student above.  I agree with the history and physical examination as documented by the resident or ACP, and/or medical student above.  Emery:  67yo F w/ PSxh of ; pmh chronic constipation c/o no BM in 2 days associated w/ mild abd and rectal discomfort, but pt is passing gas, not relieved w/ lactulose (recently prescribed by pmd). abd xr to evaluate for stool burden and air fluid levels, SMOG enema, reassess.

## 2022-01-20 NOTE — ED PROVIDER NOTE - PHYSICAL EXAMINATION
Gen: NAD, AAOx3, uncomfortabel, non-toxic appearing.  HEENT: NCAT, normal conjunctiva, oral mucosa moist.  Lung: speaking in full sentences, good aeration bilaterally, lungs CTA b/l.  CV: regular rate and rhythm. cap refill <2x. peripheral pulses 2+bilaterally.   Abd: soft, ND, NT. No rebound or guarding.   MSK: no visible deformities.  Neuro: No focal deficits.  Skin: Intact.  Psych: normal affect.

## 2022-01-20 NOTE — ED PROVIDER NOTE - PROGRESS NOTE DETAILS
Dennis Hoffman MD, Attending: Patient received at attending sign out. Xr showed huge stool burden, GI follow up and out pt constipation home management, clear instructions have been given. Victoria Parrish MD, PGY-2: manual disimpaction attempted, no stool in rectal vault. pt given smog enema prior to disimpaction, stated she passed a small stool ball.

## 2022-04-11 ENCOUNTER — EMERGENCY (EMERGENCY)
Facility: HOSPITAL | Age: 67
LOS: 1 days | Discharge: HOME | End: 2022-04-11
Attending: EMERGENCY MEDICINE | Admitting: EMERGENCY MEDICINE
Payer: MEDICARE

## 2022-04-11 VITALS
SYSTOLIC BLOOD PRESSURE: 146 MMHG | RESPIRATION RATE: 17 BRPM | HEIGHT: 62 IN | DIASTOLIC BLOOD PRESSURE: 84 MMHG | HEART RATE: 73 BPM | OXYGEN SATURATION: 98 % | TEMPERATURE: 98 F

## 2022-04-11 VITALS
OXYGEN SATURATION: 98 % | DIASTOLIC BLOOD PRESSURE: 70 MMHG | HEART RATE: 68 BPM | SYSTOLIC BLOOD PRESSURE: 105 MMHG | RESPIRATION RATE: 16 BRPM | TEMPERATURE: 97 F

## 2022-04-11 DIAGNOSIS — Z98.890 OTHER SPECIFIED POSTPROCEDURAL STATES: Chronic | ICD-10-CM

## 2022-04-11 DIAGNOSIS — Z90.710 ACQUIRED ABSENCE OF BOTH CERVIX AND UTERUS: Chronic | ICD-10-CM

## 2022-04-11 DIAGNOSIS — Z98.89 OTHER SPECIFIED POSTPROCEDURAL STATES: Chronic | ICD-10-CM

## 2022-04-11 LAB
ALBUMIN SERPL ELPH-MCNC: 4.6 G/DL — SIGNIFICANT CHANGE UP (ref 3.3–5)
ALP SERPL-CCNC: 56 U/L — SIGNIFICANT CHANGE UP (ref 40–120)
ALT FLD-CCNC: 20 U/L — SIGNIFICANT CHANGE UP (ref 4–33)
ANION GAP SERPL CALC-SCNC: 12 MMOL/L — SIGNIFICANT CHANGE UP (ref 7–14)
AST SERPL-CCNC: 23 U/L — SIGNIFICANT CHANGE UP (ref 4–32)
BASOPHILS # BLD AUTO: 0.02 K/UL — SIGNIFICANT CHANGE UP (ref 0–0.2)
BASOPHILS NFR BLD AUTO: 0.3 % — SIGNIFICANT CHANGE UP (ref 0–2)
BILIRUB SERPL-MCNC: <0.2 MG/DL — SIGNIFICANT CHANGE UP (ref 0.2–1.2)
BLOOD GAS VENOUS COMPREHENSIVE RESULT: SIGNIFICANT CHANGE UP
BUN SERPL-MCNC: 15 MG/DL — SIGNIFICANT CHANGE UP (ref 7–23)
CALCIUM SERPL-MCNC: 9.7 MG/DL — SIGNIFICANT CHANGE UP (ref 8.4–10.5)
CHLORIDE SERPL-SCNC: 104 MMOL/L — SIGNIFICANT CHANGE UP (ref 98–107)
CO2 SERPL-SCNC: 24 MMOL/L — SIGNIFICANT CHANGE UP (ref 22–31)
CREAT SERPL-MCNC: 0.58 MG/DL — SIGNIFICANT CHANGE UP (ref 0.5–1.3)
EGFR: 99 ML/MIN/1.73M2 — SIGNIFICANT CHANGE UP
EOSINOPHIL # BLD AUTO: 0.06 K/UL — SIGNIFICANT CHANGE UP (ref 0–0.5)
EOSINOPHIL NFR BLD AUTO: 0.9 % — SIGNIFICANT CHANGE UP (ref 0–6)
GLUCOSE SERPL-MCNC: 109 MG/DL — HIGH (ref 70–99)
HCT VFR BLD CALC: 41.2 % — SIGNIFICANT CHANGE UP (ref 34.5–45)
HGB BLD-MCNC: 13.6 G/DL — SIGNIFICANT CHANGE UP (ref 11.5–15.5)
IANC: 4.29 K/UL — SIGNIFICANT CHANGE UP (ref 1.8–7.4)
IMM GRANULOCYTES NFR BLD AUTO: 0.3 % — SIGNIFICANT CHANGE UP (ref 0–1.5)
LYMPHOCYTES # BLD AUTO: 1.85 K/UL — SIGNIFICANT CHANGE UP (ref 1–3.3)
LYMPHOCYTES # BLD AUTO: 27.3 % — SIGNIFICANT CHANGE UP (ref 13–44)
MAGNESIUM SERPL-MCNC: 2.1 MG/DL — SIGNIFICANT CHANGE UP (ref 1.6–2.6)
MCHC RBC-ENTMCNC: 32.5 PG — SIGNIFICANT CHANGE UP (ref 27–34)
MCHC RBC-ENTMCNC: 33 GM/DL — SIGNIFICANT CHANGE UP (ref 32–36)
MCV RBC AUTO: 98.3 FL — SIGNIFICANT CHANGE UP (ref 80–100)
MONOCYTES # BLD AUTO: 0.53 K/UL — SIGNIFICANT CHANGE UP (ref 0–0.9)
MONOCYTES NFR BLD AUTO: 7.8 % — SIGNIFICANT CHANGE UP (ref 2–14)
NEUTROPHILS # BLD AUTO: 4.29 K/UL — SIGNIFICANT CHANGE UP (ref 1.8–7.4)
NEUTROPHILS NFR BLD AUTO: 63.4 % — SIGNIFICANT CHANGE UP (ref 43–77)
NRBC # BLD: 0 /100 WBCS — SIGNIFICANT CHANGE UP
NRBC # FLD: 0 K/UL — SIGNIFICANT CHANGE UP
PHOSPHATE SERPL-MCNC: 3.7 MG/DL — SIGNIFICANT CHANGE UP (ref 2.5–4.5)
PLATELET # BLD AUTO: 229 K/UL — SIGNIFICANT CHANGE UP (ref 150–400)
POTASSIUM SERPL-MCNC: 3.9 MMOL/L — SIGNIFICANT CHANGE UP (ref 3.5–5.3)
POTASSIUM SERPL-SCNC: 3.9 MMOL/L — SIGNIFICANT CHANGE UP (ref 3.5–5.3)
PROT SERPL-MCNC: 7.8 G/DL — SIGNIFICANT CHANGE UP (ref 6–8.3)
RBC # BLD: 4.19 M/UL — SIGNIFICANT CHANGE UP (ref 3.8–5.2)
RBC # FLD: 11.7 % — SIGNIFICANT CHANGE UP (ref 10.3–14.5)
SODIUM SERPL-SCNC: 140 MMOL/L — SIGNIFICANT CHANGE UP (ref 135–145)
WBC # BLD: 6.77 K/UL — SIGNIFICANT CHANGE UP (ref 3.8–10.5)
WBC # FLD AUTO: 6.77 K/UL — SIGNIFICANT CHANGE UP (ref 3.8–10.5)

## 2022-04-11 PROCEDURE — 70496 CT ANGIOGRAPHY HEAD: CPT | Mod: 26,MA

## 2022-04-11 PROCEDURE — 70498 CT ANGIOGRAPHY NECK: CPT | Mod: 26,MA

## 2022-04-11 PROCEDURE — 99285 EMERGENCY DEPT VISIT HI MDM: CPT

## 2022-04-11 RX ORDER — ONDANSETRON 8 MG/1
4 TABLET, FILM COATED ORAL ONCE
Refills: 0 | Status: DISCONTINUED | OUTPATIENT
Start: 2022-04-11 | End: 2022-04-11

## 2022-04-11 RX ORDER — FAMOTIDINE 10 MG/ML
20 INJECTION INTRAVENOUS ONCE
Refills: 0 | Status: COMPLETED | OUTPATIENT
Start: 2022-04-11 | End: 2022-04-11

## 2022-04-11 RX ORDER — MECLIZINE HCL 12.5 MG
1 TABLET ORAL
Qty: 6 | Refills: 0
Start: 2022-04-11 | End: 2022-04-13

## 2022-04-11 RX ORDER — SODIUM CHLORIDE 9 MG/ML
1000 INJECTION, SOLUTION INTRAVENOUS ONCE
Refills: 0 | Status: COMPLETED | OUTPATIENT
Start: 2022-04-11 | End: 2022-04-11

## 2022-04-11 RX ORDER — MECLIZINE HCL 12.5 MG
25 TABLET ORAL ONCE
Refills: 0 | Status: COMPLETED | OUTPATIENT
Start: 2022-04-11 | End: 2022-04-11

## 2022-04-11 RX ORDER — ACETAMINOPHEN 500 MG
975 TABLET ORAL ONCE
Refills: 0 | Status: COMPLETED | OUTPATIENT
Start: 2022-04-11 | End: 2022-04-11

## 2022-04-11 RX ORDER — METOCLOPRAMIDE HCL 10 MG
10 TABLET ORAL ONCE
Refills: 0 | Status: COMPLETED | OUTPATIENT
Start: 2022-04-11 | End: 2022-04-11

## 2022-04-11 RX ADMIN — Medication 975 MILLIGRAM(S): at 02:53

## 2022-04-11 RX ADMIN — FAMOTIDINE 20 MILLIGRAM(S): 10 INJECTION INTRAVENOUS at 02:48

## 2022-04-11 RX ADMIN — Medication 10 MILLIGRAM(S): at 02:52

## 2022-04-11 RX ADMIN — SODIUM CHLORIDE 1000 MILLILITER(S): 9 INJECTION, SOLUTION INTRAVENOUS at 02:58

## 2022-04-11 NOTE — ED ADULT NURSE NOTE - PAIN: ALLEVIATING FACTORS
Reviewed inpatient cardiac rehabilitation education with the patient. Phase 2 Cardiac Rehab referral will be made to [facility], [phone].  A home exercise prescription, activity restrictions & precautions, and appropriate risk factor education have been completed. All education is documented in the Cardiac Rehabilitation Education Record. Total time spent educating the patient was 30 minutes.         medications, over the counter (OTC)

## 2022-04-11 NOTE — ED ADULT NURSE NOTE - OBJECTIVE STATEMENT
PT came to ED c/o dizziness and abd pain.  PT A&OX4.  No distress noted.  No SOB noted.  PT able to move all extremities.  IVL Left 20G in place and tolerating well.  Will continue to monitor.

## 2022-04-11 NOTE — ED PROVIDER NOTE - EYES, MLM
Clear bilaterally, pupils equal, round and reactive to light. Right beating nystagmus present when looking to the left.

## 2022-04-11 NOTE — ED ADULT NURSE REASSESSMENT NOTE - NS ED NURSE REASSESS COMMENT FT1
Pt at baseline mental status, MD at bedside to discharge pt. VSS and noted, RR even and unlabored.
Report received from SUSANA Dobson. Pt is A&Ox4, Ukrainian speaking. VSS and noted, RR even and unlabored, safety maintained

## 2022-04-11 NOTE — ED PROVIDER NOTE - OBJECTIVE STATEMENT
PI: [id number]    67yo F with PMHx of chronic constipation and back pain (on tramadol and gabapentin) p/w a variety of complaints. Pt is stating that since Thursday she has been having dizziness, 2 episodes of vomiting, and some chest burning and discomfort. She repeatedly reiterates that "this is too much" and she needs medication. She endorses some right sided neck pain but also states that it precedes these current symptoms. Also endorses a headache and states that she normally does not get headaches.

## 2022-04-11 NOTE — ED PROVIDER NOTE - PATIENT PORTAL LINK FT
You can access the FollowMyHealth Patient Portal offered by [facility] by registering at the following website: [email or web address]. By joining [employer]’s FollowMyHealth portal, you will also be able to view your health information using other applications (apps) compatible with our system.

## 2022-04-11 NOTE — ED PROVIDER NOTE - PROGRESS NOTE DETAILS
Baldev Saeed MD: Patient was able to ambulate and symptoms have greatly improved. Labwork and imaging was normal; plan to d/c.

## 2022-04-11 NOTE — ED PROVIDER NOTE - ATTENDING CONTRIBUTION TO CARE
65 y/o F with h/o fibromyalgia, chronic back pain here with mult complaints.  Pt reports a few days of dizziness, nausea, vomiting.  Pt also reports chest burning (?after vomiting), occasional neck and back pain which are chronic.  (+)headaches.  No fever, abd pain, diarrhea.  Pt lying in stretcher, awake and alert, nontoxic.  AF/VSS.  NCAT EOMI PERRL.  Neck supple, no nuchal rigidity.  Lungs cta bl.  Cards nl S1/S2, RRR, no MRG.  Abd soft ntnd.  No focal neuro deficits, gait steady.  Likely peripheral vertigo, no concerning neuro findings on exam to suggest central etiology, but given reported severity of sxs, difficult to obtain clear hx from this patient, will obtain labs, cta head and neck to eval for posterior stroke, ivfs, symptomatic care, reassess.

## 2022-04-11 NOTE — ED ADULT TRIAGE NOTE - CHIEF COMPLAINT QUOTE
Pt arrives to ED via EMS from home c/o N/V, neck and back pain, dizziness and HA stating it feels like vertigo but much worse than usual.  Pt denies CP.  Pt takes Tramadol for arthritis.

## 2022-09-21 ENCOUNTER — EMERGENCY (EMERGENCY)
Facility: HOSPITAL | Age: 67
LOS: 1 days | Discharge: HOME | End: 2022-09-21
Attending: EMERGENCY MEDICINE
Payer: MEDICARE

## 2022-09-21 VITALS
SYSTOLIC BLOOD PRESSURE: 140 MMHG | RESPIRATION RATE: 16 BRPM | WEIGHT: 145.95 LBS | TEMPERATURE: 98 F | HEIGHT: 62 IN | OXYGEN SATURATION: 98 % | HEART RATE: 70 BPM | DIASTOLIC BLOOD PRESSURE: 82 MMHG

## 2022-09-21 VITALS
DIASTOLIC BLOOD PRESSURE: 67 MMHG | HEART RATE: 67 BPM | RESPIRATION RATE: 16 BRPM | SYSTOLIC BLOOD PRESSURE: 134 MMHG | OXYGEN SATURATION: 100 %

## 2022-09-21 DIAGNOSIS — Z98.890 OTHER SPECIFIED POSTPROCEDURAL STATES: Chronic | ICD-10-CM

## 2022-09-21 DIAGNOSIS — Z90.710 ACQUIRED ABSENCE OF BOTH CERVIX AND UTERUS: Chronic | ICD-10-CM

## 2022-09-21 DIAGNOSIS — Z98.89 OTHER SPECIFIED POSTPROCEDURAL STATES: Chronic | ICD-10-CM

## 2022-09-21 LAB
ALBUMIN SERPL ELPH-MCNC: 4.5 G/DL — SIGNIFICANT CHANGE UP (ref 3.3–5)
ALP SERPL-CCNC: 85 U/L — SIGNIFICANT CHANGE UP (ref 40–120)
ALT FLD-CCNC: 26 U/L — SIGNIFICANT CHANGE UP (ref 10–45)
ANION GAP SERPL CALC-SCNC: 9 MMOL/L — SIGNIFICANT CHANGE UP (ref 5–17)
APTT BLD: 31.3 SEC — SIGNIFICANT CHANGE UP (ref 27.5–35.5)
AST SERPL-CCNC: 51 U/L — HIGH (ref 10–40)
BASOPHILS # BLD AUTO: 0.02 K/UL — SIGNIFICANT CHANGE UP (ref 0–0.2)
BASOPHILS NFR BLD AUTO: 0.3 % — SIGNIFICANT CHANGE UP (ref 0–2)
BILIRUB SERPL-MCNC: 0.4 MG/DL — SIGNIFICANT CHANGE UP (ref 0.2–1.2)
BUN SERPL-MCNC: 22 MG/DL — SIGNIFICANT CHANGE UP (ref 7–23)
CALCIUM SERPL-MCNC: 9.8 MG/DL — SIGNIFICANT CHANGE UP (ref 8.4–10.5)
CHLORIDE SERPL-SCNC: 104 MMOL/L — SIGNIFICANT CHANGE UP (ref 96–108)
CO2 SERPL-SCNC: 25 MMOL/L — SIGNIFICANT CHANGE UP (ref 22–31)
CREAT SERPL-MCNC: 0.58 MG/DL — SIGNIFICANT CHANGE UP (ref 0.5–1.3)
EGFR: 99 ML/MIN/1.73M2 — SIGNIFICANT CHANGE UP
EOSINOPHIL # BLD AUTO: 0.23 K/UL — SIGNIFICANT CHANGE UP (ref 0–0.5)
EOSINOPHIL NFR BLD AUTO: 3.5 % — SIGNIFICANT CHANGE UP (ref 0–6)
FLUAV AG NPH QL: SIGNIFICANT CHANGE UP
FLUBV AG NPH QL: SIGNIFICANT CHANGE UP
GLUCOSE SERPL-MCNC: 95 MG/DL — SIGNIFICANT CHANGE UP (ref 70–99)
HCT VFR BLD CALC: 38.7 % — SIGNIFICANT CHANGE UP (ref 34.5–45)
HGB BLD-MCNC: 12.9 G/DL — SIGNIFICANT CHANGE UP (ref 11.5–15.5)
IMM GRANULOCYTES NFR BLD AUTO: 0.5 % — SIGNIFICANT CHANGE UP (ref 0–0.9)
INR BLD: 0.99 RATIO — SIGNIFICANT CHANGE UP (ref 0.88–1.16)
LYMPHOCYTES # BLD AUTO: 1.65 K/UL — SIGNIFICANT CHANGE UP (ref 1–3.3)
LYMPHOCYTES # BLD AUTO: 25 % — SIGNIFICANT CHANGE UP (ref 13–44)
MCHC RBC-ENTMCNC: 32.7 PG — SIGNIFICANT CHANGE UP (ref 27–34)
MCHC RBC-ENTMCNC: 33.3 GM/DL — SIGNIFICANT CHANGE UP (ref 32–36)
MCV RBC AUTO: 98 FL — SIGNIFICANT CHANGE UP (ref 80–100)
MONOCYTES # BLD AUTO: 0.52 K/UL — SIGNIFICANT CHANGE UP (ref 0–0.9)
MONOCYTES NFR BLD AUTO: 7.9 % — SIGNIFICANT CHANGE UP (ref 2–14)
NEUTROPHILS # BLD AUTO: 4.14 K/UL — SIGNIFICANT CHANGE UP (ref 1.8–7.4)
NEUTROPHILS NFR BLD AUTO: 62.8 % — SIGNIFICANT CHANGE UP (ref 43–77)
NRBC # BLD: 0 /100 WBCS — SIGNIFICANT CHANGE UP (ref 0–0)
PLATELET # BLD AUTO: 269 K/UL — SIGNIFICANT CHANGE UP (ref 150–400)
POTASSIUM SERPL-MCNC: 5.7 MMOL/L — HIGH (ref 3.5–5.3)
POTASSIUM SERPL-SCNC: 5.7 MMOL/L — HIGH (ref 3.5–5.3)
PROT SERPL-MCNC: 7.8 G/DL — SIGNIFICANT CHANGE UP (ref 6–8.3)
PROTHROM AB SERPL-ACNC: 11.5 SEC — SIGNIFICANT CHANGE UP (ref 10.5–13.4)
RBC # BLD: 3.95 M/UL — SIGNIFICANT CHANGE UP (ref 3.8–5.2)
RBC # FLD: 12.2 % — SIGNIFICANT CHANGE UP (ref 10.3–14.5)
RSV RNA NPH QL NAA+NON-PROBE: SIGNIFICANT CHANGE UP
SARS-COV-2 RNA SPEC QL NAA+PROBE: SIGNIFICANT CHANGE UP
SODIUM SERPL-SCNC: 138 MMOL/L — SIGNIFICANT CHANGE UP (ref 135–145)
WBC # BLD: 6.59 K/UL — SIGNIFICANT CHANGE UP (ref 3.8–10.5)
WBC # FLD AUTO: 6.59 K/UL — SIGNIFICANT CHANGE UP (ref 3.8–10.5)

## 2022-09-21 PROCEDURE — 87637 SARSCOV2&INF A&B&RSV AMP PRB: CPT

## 2022-09-21 PROCEDURE — 70486 CT MAXILLOFACIAL W/O DYE: CPT | Mod: MA

## 2022-09-21 PROCEDURE — 80053 COMPREHEN METABOLIC PANEL: CPT

## 2022-09-21 PROCEDURE — 71250 CT THORAX DX C-: CPT | Mod: MA

## 2022-09-21 PROCEDURE — 76377 3D RENDER W/INTRP POSTPROCES: CPT

## 2022-09-21 PROCEDURE — 71250 CT THORAX DX C-: CPT | Mod: 26,MA

## 2022-09-21 PROCEDURE — 76377 3D RENDER W/INTRP POSTPROCES: CPT | Mod: 26

## 2022-09-21 PROCEDURE — 93005 ELECTROCARDIOGRAM TRACING: CPT

## 2022-09-21 PROCEDURE — 70450 CT HEAD/BRAIN W/O DYE: CPT | Mod: 26,MA

## 2022-09-21 PROCEDURE — 99285 EMERGENCY DEPT VISIT HI MDM: CPT

## 2022-09-21 PROCEDURE — 93010 ELECTROCARDIOGRAM REPORT: CPT

## 2022-09-21 PROCEDURE — 70486 CT MAXILLOFACIAL W/O DYE: CPT | Mod: 26,MA

## 2022-09-21 PROCEDURE — 96374 THER/PROPH/DIAG INJ IV PUSH: CPT

## 2022-09-21 PROCEDURE — 70450 CT HEAD/BRAIN W/O DYE: CPT | Mod: MA

## 2022-09-21 PROCEDURE — 85610 PROTHROMBIN TIME: CPT

## 2022-09-21 PROCEDURE — 85730 THROMBOPLASTIN TIME PARTIAL: CPT

## 2022-09-21 PROCEDURE — 85025 COMPLETE CBC W/AUTO DIFF WBC: CPT

## 2022-09-21 PROCEDURE — 99285 EMERGENCY DEPT VISIT HI MDM: CPT | Mod: 25

## 2022-09-21 RX ORDER — OXYCODONE HYDROCHLORIDE 5 MG/1
1 TABLET ORAL
Qty: 6 | Refills: 0
Start: 2022-09-21 | End: 2022-09-22

## 2022-09-21 RX ORDER — OXYCODONE HYDROCHLORIDE 5 MG/1
5 TABLET ORAL ONCE
Refills: 0 | Status: DISCONTINUED | OUTPATIENT
Start: 2022-09-21 | End: 2022-09-21

## 2022-09-21 RX ORDER — ACETAMINOPHEN 500 MG
1000 TABLET ORAL ONCE
Refills: 0 | Status: COMPLETED | OUTPATIENT
Start: 2022-09-21 | End: 2022-09-21

## 2022-09-21 RX ADMIN — OXYCODONE HYDROCHLORIDE 5 MILLIGRAM(S): 5 TABLET ORAL at 20:11

## 2022-09-21 RX ADMIN — Medication 400 MILLIGRAM(S): at 18:21

## 2022-09-21 NOTE — ED PROVIDER NOTE - ATTENDING APP SHARED VISIT CONTRIBUTION OF CARE
Fall six days ago in [place], struck face.  There had CT (patient has physical films, does not know results - on my review appears to have R medial orbital/sinus fracture).  Here complaining of worsening facial pains, some blurred vision, L sided chest pains, hemoptysis.    Exam:  General: Patient well appearing, vital signs within normal limits  HENT: airway patent with moist mucous membranes, ecchymosis to R medial orbit  Eye: extraoccular movements intact, pupils equal round and reactive  Cardiac: RRR S1/S2 with strong peripheral pulses  Respiratory: lungs clear without respiratory distress, L chest wall tenderness to palpation  GI: abdomen soft, non tender, non distended  Neuro: no gross neurologic deficits  Skin: warm, well perfused  Psych: normal mood and affect    Suspect hemoptysis might be secondary to sinus bleeding? but given chest wall tenderness will repeat labs, CTH maxillofacial and chest to r/o rib fractures, treat pain and re-evaluate.

## 2022-09-21 NOTE — ED ADULT TRIAGE NOTE - CHIEF COMPLAINT QUOTE
trip and fall while in [place] had a skull fx there flew home on Monday and started co worsening headaches, blurry vision since returning home

## 2022-09-21 NOTE — ED PROVIDER NOTE - PROGRESS NOTE DETAILS
pt seen and evaluated by Plastics. pt to follow up outpt with sinus precautions. pain control provided. -DIXON Donnelly va os 20/25, od 20/20

## 2022-09-21 NOTE — ED PROVIDER NOTE - PATIENT PORTAL LINK FT
You can access the FollowMyHealth Patient Portal offered by [facility] by registering at the following website: [email or web address]. By joining [employer]’s FollowMyHealth portal, you will also be able to view your health information using other applications (apps) compatible with our system.

## 2022-09-21 NOTE — ED PROVIDER NOTE - PHYSICAL EXAMINATION
Gen: AAO x 3, NAD  Skin: +ecchymosis to the L periorbital region  HEENT: NC/AT, PERRLA, EOMI, MMM L eye with subconjunctival hemorrhage. +bony tenderness   Resp: unlabored CTAB  Cardiac: rrr s1s2, no murmurs, rubs or gallops  GI: ND, +BS, Soft, NT  Ext: no pedal edema, FROM in all extremities  Neuro: no focal deficits

## 2022-09-21 NOTE — ED ADULT NURSE NOTE - OBJECTIVE STATEMENT
66 yo female with a PMH of vertigo, arthritis presents to the ED via EMS from urgent care complaining of a fall. Patient was in [place] when she had a mechanical fall on Friday [3 steps]. Patient reports falling forward and hitting her head on concrete, + LOC. Unknown length of time. Patient states she was taken to the hospital "they treated me and let me go." Reports that post fall on Friday she began having diplopia on Saturday with accompanying bloody sputum. Patient brings CT scan results with her. Flew back Monday, was having increased headaches and L sided body aches. Today was worse and went to  who sent her in for evaluation. Patient's neuro intact. 3mm PERRL B/L. Patient states that vision is now blurry. Ecchymosis noted on L orbital area. Possible periorbital fracture seen by MD Zhong on CT scan images. On cardiac monitor, labs sent. Denies chest pain, shortness of breath, abdominal pain, nausea, vomiting, diarrhea, fevers, chills, dysuria, hematuria, recent illness travel.

## 2022-09-21 NOTE — ED PROVIDER NOTE - CARE PROVIDER_API CALL
Maged Soares)  Plastic Surgery  [address], [place] [address]  Phone: [phone]  Fax: [phone]  Follow Up Time:

## 2022-09-21 NOTE — CONSULT NOTE ADULT - ASSESSMENT
ASSESSMENT/PLAN:   KAMILLA MEDINA is a 67y Female s/p mechanical fall 6d ago, CT maxface shows orbital floor fx. No entrapment.    - Sinus precautions, please review with patient  - Follow up with Dr. Maged Soares in 1 week.  - Appreciate excellent ED care  - Ok for dc from PRS perspective    Discussed with Dr. Soares    Plastic Surgery   [facility]: [phone]

## 2022-09-21 NOTE — ED PROVIDER NOTE - NS ED ROS FT
Constitutional: No fever or chills  Eyes: No visual changes, eye pain or redness  HEENT: No throat pain, ear pain, nasal pain. No nose bleeding.  CV: No chest pain or lower extremity edema  Resp: No SOB +cough  GI: No abd pain. No nausea or vomiting. No diarrhea. No constipation.   : No dysuria, hematuria.   MSK: +musculoskeletal pain  Skin: No rash +bruising   Neuro: No headache. No numbness or tingling. No weakness.

## 2022-09-21 NOTE — ED PROVIDER NOTE - OBJECTIVE STATEMENT
67yof pmhx of Depression, arthritis BIB EMS from urgent care for facial pain. pt reports s/p mechanical fall 5 days ago in [place] down concrete stairs with facial trauma. neg head CT, returned to the states yesterday with persistent pain. also reports L sided chest wall tenderness and hemoptysis day after the fall. No fever or chills. No vomiting. reports intermittent double vision to the L eye.
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
## 2022-09-21 NOTE — ED PROVIDER NOTE - NSFOLLOWUPINSTRUCTIONS_ED_ALL_ED_FT
Follow up with your PMD within 48-72 hrs. Show copies of your reports given to you. Take all of your medications as previously prescribed. Tylenol 650 every 4-6 hours for pain.     - Avoid nasal trauma: no nose rubbing, blowing or manipulating nasal packing.  Sneeze with mouth open while pinching the nose.  - Avoid bending with head blow the waist.    -No heavy lifting.     Follow up with plastics, Next week. Call the office tomorrow to make appt.     Maged Soares)  Plastic Surgery  [address] [place] [address], [address] [place] [address]  Phone: [phone]     Return for severe pain, worsening blurry vision, unable to move eye or any other concerns. Follow up with your PMD within 48-72 hrs. Show copies of your reports given to you. Take all of your medications as previously prescribed. Tylenol 650 every 4-6 hours for pain.     - Avoid nasal trauma: no nose rubbing, blowing or manipulating nasal packing.  Sneeze with mouth open while pinching the nose.  - Avoid bending with head blow the waist.    -No heavy lifting.   - Take oxycodone as prescribed for breakthrough severe pain    Follow up with plastics, Next week. Call the office tomorrow to make appt.     Maged Soares)  Plastic Surgery  [address] [place] [address], [address] [place] [address]  Phone: [phone]     Return for severe pain, worsening blurry vision, unable to move eye or any other concerns.
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
## 2022-09-22 NOTE — ED POST DISCHARGE NOTE - ADDITIONAL DOCUMENTATION
IF: CT maxface w/ orbital floor fracture stating clinical correlation for extraocular muscle entrapment is recommended. Pt seen by plastics who advised no entrapment - Denise Kwon PA-C

## 2022-10-12 ENCOUNTER — APPOINTMENT (OUTPATIENT)
Dept: VASCULAR SURGERY | Facility: CLINIC | Age: 67
End: 2022-10-12

## 2022-10-12 VITALS
BODY MASS INDEX: 27.23 KG/M2 | TEMPERATURE: 98 F | SYSTOLIC BLOOD PRESSURE: 116 MMHG | DIASTOLIC BLOOD PRESSURE: 77 MMHG | WEIGHT: 148 LBS | HEART RATE: 80 BPM | HEIGHT: 62 IN

## 2022-10-12 PROCEDURE — 93970 EXTREMITY STUDY: CPT

## 2022-10-12 PROCEDURE — 99203 OFFICE O/P NEW LOW 30 MIN: CPT

## 2022-10-12 NOTE — PHYSICAL EXAM
[Respiratory Effort] : normal respiratory effort [Normal Rate and Rhythm] : normal rate and rhythm [2+] : left 2+ [Ankle Swelling (On Exam)] : not present [Varicose Veins Of Lower Extremities] : bilaterally [Ankle Swelling On The Right] : mild [] : not present [Abdomen Tenderness] : ~T ~M No abdominal tenderness [Skin Ulcer] : no ulcer [Alert] : alert [Oriented to Person] : oriented to person [Oriented to Place] : oriented to place [Oriented to Time] : oriented to time [Calm] : calm [de-identified] : appears stated age [de-identified] : normocephalic, atraumatic [de-identified] : supple

## 2022-10-12 NOTE — ASSESSMENT
[FreeTextEntry1] : Problem #1 spider veins\par - pt instructed sclerotherapy can be performed as a cosmetic procedure\par - no role for further endovenous ablation or stab phlebectomies \par - recommend to continue compression and elevation\par - neurology referral made for toe numbness\par - follow up as needed

## 2022-10-12 NOTE — HISTORY OF PRESENT ILLNESS
[FreeTextEntry1] : 67 year old female with history of venous insufficiency s/p bilateral venous ablations, vertigo, and arthritis who presents for bilateral leg pain worse on the right side. She states her spider vein clusters cause her pain and she was told that something can be done to relieve the pain. She denies swelling. She denies claudication or rest pain.

## 2022-12-14 NOTE — ED PROVIDER NOTE - CLINICAL SUMMARY MEDICAL DECISION MAKING FREE TEXT BOX
65yo F with a variety of complaints. Low suspicion of dissection given timeframe of symptoms and lack of radiation. Given nystagmus will obtain CT scans to further evaluate for stroke; however, symptoms  began >3 days ago so no interventions are possible. constant

## 2023-11-20 NOTE — ED ADULT TRIAGE NOTE - NSTRIAGECARE_GEN_A_ER
----- Message from Steph Spears on behalf of Adal Malone sent at 11/20/2023  1:37 PM EST -----  Regarding: Adal- stomach illness  Contact: [phone]  Thank you...one other question and I am sorry to bother  you,  but he is coughing now as well.  Any cough syrup that is safe to take with him being on Fluoxetine ?   Thank you again  
Face Mask

## 2023-12-23 ENCOUNTER — EMERGENCY (EMERGENCY)
Facility: HOSPITAL | Age: 68
LOS: 1 days | Discharge: HOME | End: 2023-12-23
Attending: STUDENT IN AN ORGANIZED HEALTH CARE EDUCATION/TRAINING PROGRAM | Admitting: STUDENT IN AN ORGANIZED HEALTH CARE EDUCATION/TRAINING PROGRAM
Payer: MEDICARE

## 2023-12-23 VITALS
TEMPERATURE: 98 F | RESPIRATION RATE: 14 BRPM | DIASTOLIC BLOOD PRESSURE: 68 MMHG | SYSTOLIC BLOOD PRESSURE: 121 MMHG | OXYGEN SATURATION: 97 % | HEART RATE: 88 BPM

## 2023-12-23 DIAGNOSIS — Z90.710 ACQUIRED ABSENCE OF BOTH CERVIX AND UTERUS: Chronic | ICD-10-CM

## 2023-12-23 DIAGNOSIS — Z98.890 OTHER SPECIFIED POSTPROCEDURAL STATES: Chronic | ICD-10-CM

## 2023-12-23 DIAGNOSIS — Z98.89 OTHER SPECIFIED POSTPROCEDURAL STATES: Chronic | ICD-10-CM

## 2023-12-23 PROCEDURE — 99284 EMERGENCY DEPT VISIT MOD MDM: CPT
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
## 2023-12-23 NOTE — ED PROVIDER NOTE - PHYSICAL EXAMINATION
Steffany FORD:  VITALS: Initial triage and subsequent vitals have been reviewed by me.  GEN APPEARANCE: Alert, cooperative. Non-toxic appearing. Well appearing. NAD.  HEAD: Atraumatic, normocephalic   EYES: PERRLa, EOMI, vision grossly intact.   EARS: Gross hearing intact.   NOSE: No nasal discharge, no external evidence of epistaxis.   NECK: Supple  CV: RRR, S1S2, no c/r/m/g. No cyanosis. Extremities warm, well perfused. Cap refill <2 seconds. No bruits.   LUNGS: CTAB. No wheezing. No rales. No rhonchi. No diminished breath sounds.   ABDOMEN: Soft, NTND. No guarding or rebound. No masses.   MSK/EXT: Spine appears normal, no spine point tenderness. No CVA ttp. Normal muscular development. Pelvis stable. No obvious joint or bony deformity, no peripheral edema. L 3rd finger w intermittent catching w flexion/extension   NEURO: Alert, follows commands. Weight bearing normal. Speech normal. Sensation and motor normal x4 extremities.   SKIN: scattered blisters to b/l dorsum of feet, no ulcerations no bleeding +2 DP pulses    PSYCH: Normal mood and affect.

## 2023-12-23 NOTE — ED PROVIDER NOTE - CARE PROVIDER_API CALL
Iris Boateng  Surgery [facility]  [address], [address]  [place] [address]  Phone: [phone]  Fax: [phone]  Follow Up Time:    Iris Boateng  Sur[facility]  [address], [address]  [place] [address]  Phone: [phone]  Fax: [phone]  Follow Up Time:
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
## 2024-01-01 NOTE — ED PROVIDER NOTE - IV ALTEPLASE EXCL ABS HIDDEN
show 1 Principal Discharge DX:	 infant of 40 completed weeks of gestation  Assessment and plan of treatment:	Routine care of . Please follow up with your pediatrician in 1-2days.   Please make sure to feed your  every 3 hours or sooner as baby demands. Breast milk is preferable, either through breastfeeding or via pumping of breast milk. If you do not have enough breast milk please supplement with formula. Please seek immediate medical attention is your baby seems to not be feeding well or has persistent vomiting. If baby appears yellow or jaundiced please consult with your pediatrician. You must follow up with your pediatrician in 1-2 days. If your baby has a fever of 100.4F or more you must seek medical care in an emergency room immediately. Please call [facility] or your pediatrician if you should have any other questions or concerns.  Secondary Diagnosis:	LGA (large for gestational age) infant  Secondary Diagnosis:	 affected by (positive) maternal group b Streptococcus (GBS) colonization   Principal Discharge DX:	 infant of 40 completed weeks of gestation  Assessment and plan of treatment:	Routine care of . Please follow up with your pediatrician in 1-2days.   Please make sure to feed your  every 3 hours or sooner as baby demands. Breast milk is preferable, either through breastfeeding or via pumping of breast milk. If you do not have enough breast milk please supplement with formula. Please seek immediate medical attention is your baby seems to not be feeding well or has persistent vomiting. If baby appears yellow or jaundiced please consult with your pediatrician. You must follow up with your pediatrician in 1-2 days. If your baby has a fever of 100.4F or more you must seek medical care in an emergency room immediately. Please call [facility] or your pediatrician if you should have any other questions or concerns.  Secondary Diagnosis:	LGA (large for gestational age) infant  Secondary Diagnosis:	 affected by (positive) maternal group b Streptococcus (GBS) colonization   Principal Discharge DX:	 infant of 40 completed weeks of gestation  Assessment and plan of treatment:	Routine care of . Please follow up with your pediatrician in 1-2days.   Please make sure to feed your  every 3 hours or sooner as baby demands. Breast milk is preferable, either through breastfeeding or via pumping of breast milk. If you do not have enough breast milk please supplement with formula. Please seek immediate medical attention is your baby seems to not be feeding well or has persistent vomiting. If baby appears yellow or jaundiced please consult with your pediatrician. You must follow up with your pediatrician in 1-2 days. If your baby has a fever of 100.4F or more you must seek medical care in an emergency room immediately. Please call [facility] or your pediatrician if you should have any other questions or concerns.  Secondary Diagnosis:	LGA (large for gestational age) infant  Assessment and plan of treatment:	Blood glucose monitoring per protocol for first 24 hours of life, within normal limits upon discharge  Secondary Diagnosis:	[place] affected by (positive) maternal group b Streptococcus (GBS) colonization  Assessment and plan of treatment:	 monitored in WBN for a total of 36 hours.  EOS scores done at birth and after physical examination were both under 1.  Vital signs were done every 4 hours for the first 24 HOL and were within normal limits.  Infant stable for safe discharge home.   Principal Discharge DX:	 infant of 40 completed weeks of gestation  Assessment and plan of treatment:	Routine care of . Please follow up with your pediatrician in 1-2days.   Please make sure to feed your  every 3 hours or sooner as baby demands. Breast milk is preferable, either through breastfeeding or via pumping of breast milk. If you do not have enough breast milk please supplement with formula. Please seek immediate medical attention is your baby seems to not be feeding well or has persistent vomiting. If baby appears yellow or jaundiced please consult with your pediatrician. You must follow up with your pediatrician in 1-2 days. If your baby has a fever of 100.4F or more you must seek medical care in an emergency room immediately. Please call [facility] or your pediatrician if you should have any other questions or concerns.  Secondary Diagnosis:	LGA (large for gestational age) infant  Assessment and plan of treatment:	Blood glucose monitoring per protocol for first 24 hours of life, within normal limits upon discharge  Secondary Diagnosis:	[place] affected by (positive) maternal group b Streptococcus (GBS) colonization  Assessment and plan of treatment:	 monitored in WBN for a total of 36 hours.  EOS scores done at birth and after physical examination were both under 1.  Vital signs were done every 4 hours for the first 24 HOL and were within normal limits.  Infant stable for safe discharge home.

## 2024-01-25 ENCOUNTER — APPOINTMENT (OUTPATIENT)
Dept: WOUND CARE | Facility: HOSPITAL | Age: 69
End: 2024-01-25

## 2024-01-28 ENCOUNTER — EMERGENCY (EMERGENCY)
Facility: HOSPITAL | Age: 69
LOS: 1 days | Discharge: HOME | End: 2024-01-28
Attending: STUDENT IN AN ORGANIZED HEALTH CARE EDUCATION/TRAINING PROGRAM | Admitting: STUDENT IN AN ORGANIZED HEALTH CARE EDUCATION/TRAINING PROGRAM
Payer: MEDICARE

## 2024-01-28 VITALS
DIASTOLIC BLOOD PRESSURE: 80 MMHG | SYSTOLIC BLOOD PRESSURE: 121 MMHG | TEMPERATURE: 98 F | OXYGEN SATURATION: 97 % | HEART RATE: 69 BPM | RESPIRATION RATE: 18 BRPM

## 2024-01-28 VITALS
OXYGEN SATURATION: 98 % | TEMPERATURE: 98 F | SYSTOLIC BLOOD PRESSURE: 111 MMHG | RESPIRATION RATE: 18 BRPM | DIASTOLIC BLOOD PRESSURE: 77 MMHG | HEART RATE: 90 BPM

## 2024-01-28 DIAGNOSIS — Z90.710 ACQUIRED ABSENCE OF BOTH CERVIX AND UTERUS: Chronic | ICD-10-CM

## 2024-01-28 DIAGNOSIS — Z98.890 OTHER SPECIFIED POSTPROCEDURAL STATES: Chronic | ICD-10-CM

## 2024-01-28 DIAGNOSIS — Z98.89 OTHER SPECIFIED POSTPROCEDURAL STATES: Chronic | ICD-10-CM

## 2024-01-28 LAB
ALBUMIN SERPL ELPH-MCNC: 4.6 G/DL — SIGNIFICANT CHANGE UP (ref 3.3–5)
ALP SERPL-CCNC: 74 U/L — SIGNIFICANT CHANGE UP (ref 40–120)
ALT FLD-CCNC: 26 U/L — SIGNIFICANT CHANGE UP (ref 4–33)
ANION GAP SERPL CALC-SCNC: 12 MMOL/L — SIGNIFICANT CHANGE UP (ref 7–14)
AST SERPL-CCNC: 30 U/L — SIGNIFICANT CHANGE UP (ref 4–32)
BASOPHILS # BLD AUTO: 0.02 K/UL — SIGNIFICANT CHANGE UP (ref 0–0.2)
BASOPHILS NFR BLD AUTO: 0.2 % — SIGNIFICANT CHANGE UP (ref 0–2)
BILIRUB SERPL-MCNC: 0.3 MG/DL — SIGNIFICANT CHANGE UP (ref 0.2–1.2)
BUN SERPL-MCNC: 18 MG/DL — SIGNIFICANT CHANGE UP (ref 7–23)
CALCIUM SERPL-MCNC: 9.8 MG/DL — SIGNIFICANT CHANGE UP (ref 8.4–10.5)
CHLORIDE SERPL-SCNC: 104 MMOL/L — SIGNIFICANT CHANGE UP (ref 98–107)
CO2 SERPL-SCNC: 23 MMOL/L — SIGNIFICANT CHANGE UP (ref 22–31)
CREAT SERPL-MCNC: 0.63 MG/DL — SIGNIFICANT CHANGE UP (ref 0.5–1.3)
CRP SERPL-MCNC: <3 MG/L — SIGNIFICANT CHANGE UP
EGFR: 96 ML/MIN/1.73M2 — SIGNIFICANT CHANGE UP
EOSINOPHIL # BLD AUTO: 0.01 K/UL — SIGNIFICANT CHANGE UP (ref 0–0.5)
EOSINOPHIL NFR BLD AUTO: 0.1 % — SIGNIFICANT CHANGE UP (ref 0–6)
ERYTHROCYTE [SEDIMENTATION RATE] IN BLOOD: 14 MM/HR — SIGNIFICANT CHANGE UP (ref 4–25)
GLUCOSE SERPL-MCNC: 110 MG/DL — HIGH (ref 70–99)
HCT VFR BLD CALC: 38.8 % — SIGNIFICANT CHANGE UP (ref 34.5–45)
HGB BLD-MCNC: 13.1 G/DL — SIGNIFICANT CHANGE UP (ref 11.5–15.5)
IANC: 7.62 K/UL — HIGH (ref 1.8–7.4)
IMM GRANULOCYTES NFR BLD AUTO: 0.4 % — SIGNIFICANT CHANGE UP (ref 0–0.9)
LYMPHOCYTES # BLD AUTO: 1.32 K/UL — SIGNIFICANT CHANGE UP (ref 1–3.3)
LYMPHOCYTES # BLD AUTO: 14.2 % — SIGNIFICANT CHANGE UP (ref 13–44)
MCHC RBC-ENTMCNC: 32 PG — SIGNIFICANT CHANGE UP (ref 27–34)
MCHC RBC-ENTMCNC: 33.8 GM/DL — SIGNIFICANT CHANGE UP (ref 32–36)
MCV RBC AUTO: 94.9 FL — SIGNIFICANT CHANGE UP (ref 80–100)
MONOCYTES # BLD AUTO: 0.31 K/UL — SIGNIFICANT CHANGE UP (ref 0–0.9)
MONOCYTES NFR BLD AUTO: 3.3 % — SIGNIFICANT CHANGE UP (ref 2–14)
NEUTROPHILS # BLD AUTO: 7.62 K/UL — HIGH (ref 1.8–7.4)
NEUTROPHILS NFR BLD AUTO: 81.8 % — HIGH (ref 43–77)
NRBC # BLD: 0 /100 WBCS — SIGNIFICANT CHANGE UP (ref 0–0)
NRBC # FLD: 0 K/UL — SIGNIFICANT CHANGE UP (ref 0–0)
PLATELET # BLD AUTO: 237 K/UL — SIGNIFICANT CHANGE UP (ref 150–400)
POTASSIUM SERPL-MCNC: 4.4 MMOL/L — SIGNIFICANT CHANGE UP (ref 3.5–5.3)
POTASSIUM SERPL-SCNC: 4.4 MMOL/L — SIGNIFICANT CHANGE UP (ref 3.5–5.3)
PROT SERPL-MCNC: 7.9 G/DL — SIGNIFICANT CHANGE UP (ref 6–8.3)
RBC # BLD: 4.09 M/UL — SIGNIFICANT CHANGE UP (ref 3.8–5.2)
RBC # FLD: 12 % — SIGNIFICANT CHANGE UP (ref 10.3–14.5)
SODIUM SERPL-SCNC: 139 MMOL/L — SIGNIFICANT CHANGE UP (ref 135–145)
WBC # BLD: 9.32 K/UL — SIGNIFICANT CHANGE UP (ref 3.8–10.5)
WBC # FLD AUTO: 9.32 K/UL — SIGNIFICANT CHANGE UP (ref 3.8–10.5)

## 2024-01-28 PROCEDURE — 73630 X-RAY EXAM OF FOOT: CPT | Mod: 26,LT

## 2024-01-28 PROCEDURE — 73590 X-RAY EXAM OF LOWER LEG: CPT | Mod: 26,LT

## 2024-01-28 PROCEDURE — 99284 EMERGENCY DEPT VISIT MOD MDM: CPT

## 2024-01-28 RX ORDER — MUPIROCIN 20 MG/G
1 OINTMENT TOPICAL ONCE
Refills: 0 | Status: COMPLETED | OUTPATIENT
Start: 2024-01-28 | End: 2024-01-28

## 2024-01-28 RX ORDER — CEFUROXIME AXETIL 250 MG
1 TABLET ORAL
Qty: 14 | Refills: 0
Start: 2024-01-28 | End: 2024-02-03

## 2024-01-28 RX ORDER — KETOROLAC TROMETHAMINE 30 MG/ML
15 SYRINGE (ML) INJECTION ONCE
Refills: 0 | Status: DISCONTINUED | OUTPATIENT
Start: 2024-01-28 | End: 2024-01-28

## 2024-01-28 RX ORDER — CEFOTETAN DISODIUM 1 G
2 VIAL (EA) INJECTION ONCE
Refills: 0 | Status: COMPLETED | OUTPATIENT
Start: 2024-01-28 | End: 2024-01-28

## 2024-01-28 RX ADMIN — Medication 15 MILLIGRAM(S): at 14:57

## 2024-01-28 RX ADMIN — Medication 100 GRAM(S): at 16:02

## 2024-01-28 RX ADMIN — Medication 2 GRAM(S): at 16:48

## 2024-01-28 RX ADMIN — Medication 15 MILLIGRAM(S): at 14:24

## 2024-01-28 RX ADMIN — Medication 1 TABLET(S): at 14:23

## 2024-01-28 RX ADMIN — MUPIROCIN 1 APPLICATION(S): 20 OINTMENT TOPICAL at 16:03

## 2024-01-28 RX ADMIN — Medication 100 MILLIGRAM(S): at 14:25
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
## 2024-01-28 NOTE — ED ADULT NURSE REASSESSMENT NOTE - NS ED NURSE REASSESS COMMENT FT1
Break RN: Pt is A&Ox4, resting in stretcher with no complaints at this time. Respirations even and unlabored, chest rise equal b/l. No acute distress noted. IV removed and discharge paperwork provided to pt by provider. Safety maintained throughout.
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
## 2024-01-28 NOTE — ED PROVIDER NOTE - PHYSICAL EXAMINATION
Gen: AAOx3, non-toxic  Head: NCAT  HEENT: EOMI, oral mucosa moist, normal conjunctiva  Lung: CTAB, no respiratory distress, no wheezes/rhonchi/rales B/L,   CV: RRR, no murmurs, rubs or gallops  Abd: soft, NTND, no guarding, no CVA tenderness  MSK: no visible deformities, dp pulses 2+, R dorsum foot with well healing blister wound, L foot with silver sulfadiazene smeared over it, blister sites w mild erythema over dorsum of proximal digits 2-3-4-5, moderately ttp, warm to touch, R plantar/dorsiflexion 5/5, L 4+/5 2/2 pain  Neuro: No focal sensory or motor deficits  Skin: Warm, well perfused, no rash  Psych: normal affect.
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
## 2024-01-28 NOTE — ED ADULT NURSE NOTE - NSFALLUNIVINTERV_ED_ALL_ED
Bed/Stretcher in lowest position, wheels locked, appropriate side rails in place/Call bell, personal items and telephone in reach/Instruct patient to call for assistance before getting out of bed/chair/stretcher/Non-slip footwear applied when patient is off stretcher/Dora to call system/Physically safe environment - no spills, clutter or unnecessary equipment/Purposeful proactive rounding/Room/bathroom lighting operational, light cord in reach
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
## 2024-01-28 NOTE — ED PROVIDER NOTE - PATIENT PORTAL LINK FT
You can access the FollowMyHealth Patient Portal offered by [facility] by registering at the following website: [email or web address]. By joining [employer]’s FollowMyHealth portal, you will also be able to view your health information using other applications (apps) compatible with our system.
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
## 2024-01-28 NOTE — ED PROVIDER NOTE - ED STEMI HIDDEN
hide
44F with concern for hypertension post partum concern for pre-eclampsia asymptomatic pt feels she has no symptoms. Denies cp sob ha n/v/abd pain. Reports compliance to medication regimen of labetalol 300 three times daily and nifedipine 30 twice daily. Given add'l oral labetalol/nifedipine from home medication regimen here in ER after arrival, given labetalol 10 w/o significant decrease in bp. OB consulted pt with elevated ldh, elevated ur pro/cr ratio, plan for admission to gyn for further eval. Given hydralazine with some relief of bp

## 2024-01-31 ENCOUNTER — OUTPATIENT (OUTPATIENT)
Dept: OUTPATIENT SERVICES | Facility: HOSPITAL | Age: 69
LOS: 1 days | End: 2024-01-31
Payer: MEDICARE

## 2024-01-31 ENCOUNTER — APPOINTMENT (OUTPATIENT)
Dept: WOUND CARE | Facility: HOSPITAL | Age: 69
End: 2024-01-31
Payer: MEDICARE

## 2024-01-31 VITALS
RESPIRATION RATE: 18 BRPM | DIASTOLIC BLOOD PRESSURE: 77 MMHG | HEART RATE: 76 BPM | SYSTOLIC BLOOD PRESSURE: 131 MMHG | TEMPERATURE: 97.7 F

## 2024-01-31 DIAGNOSIS — Z98.890 OTHER SPECIFIED POSTPROCEDURAL STATES: Chronic | ICD-10-CM

## 2024-01-31 DIAGNOSIS — Z98.89 OTHER SPECIFIED POSTPROCEDURAL STATES: Chronic | ICD-10-CM

## 2024-01-31 DIAGNOSIS — Z90.710 ACQUIRED ABSENCE OF BOTH CERVIX AND UTERUS: Chronic | ICD-10-CM

## 2024-01-31 DIAGNOSIS — T30.0 BURN OF UNSPECIFIED BODY REGION, UNSPECIFIED DEGREE: ICD-10-CM

## 2024-01-31 PROCEDURE — G0463: CPT

## 2024-01-31 PROCEDURE — 99205 OFFICE O/P NEW HI 60 MIN: CPT

## 2024-01-31 PROCEDURE — 99215 OFFICE O/P EST HI 40 MIN: CPT

## 2024-01-31 RX ORDER — LIDOCAINE AND PRILOCAINE 25; 25 MG/G; MG/G
2.5-2.5 CREAM TOPICAL
Qty: 1 | Refills: 0 | Status: ACTIVE | COMMUNITY
Start: 2024-01-31 | End: 1900-01-01
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
## 2024-01-31 NOTE — PLAN
[FreeTextEntry1] : 1/31/24 Plan: Wash with soap and water/baby wash  Apply Medi Honey Cover with Adaptic/gauze/Nicolette Apply a small amount of Lidocaine cream for pain Optimization of nutrition. Offloading to the wound site. -----Wound supplies ordered via DME Patient given contact information to DME Lidocaine cream sent to pharmacy. Follow up appointment scheduled for two week The patient was positioned as follows .  The fluorescence imaging device was positioned 8-12 cm from the patient and the clinical darkness required for imaging was obtained.  The wound measured by Tissue [employer]  The [employer] i:X fluorescence imaging device was used to report presence and location of red or cyan fluorescence, indicative of bacteria at loads greater than 104 CFU/g in real-time.  Images reported to be negative.   Patient finished dose of antibiotics No clinical sign of infection

## 2024-01-31 NOTE — REASON FOR VISIT
[Consultation] : a consultation visit [[employer] ] : provided by [employer]   [Time Spent: ____ minutes] : Total time spent using  services: [unfilled] minutes. The patient's primary language is not English thus required  services. [FreeTextEntry1] : Left and right foot [Interpreters_IDNumber] : [id number] [Interpreters_FullName] : Erik [TWNoteComboBox1] : [place]
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
## 2024-01-31 NOTE — PHYSICAL EXAM
[Please See PDF for [employer]] : Please See PDF for [employer]. [1+] : left 1+ [de-identified] : nad [de-identified] : non traumatic [de-identified] : supple [de-identified] : equal chest rise

## 2024-02-07 DIAGNOSIS — T30.0 BURN OF UNSPECIFIED BODY REGION, UNSPECIFIED DEGREE: ICD-10-CM

## 2024-02-12 ENCOUNTER — OUTPATIENT (OUTPATIENT)
Dept: OUTPATIENT SERVICES | Facility: HOSPITAL | Age: 69
LOS: 1 days | End: 2024-02-12
Payer: MEDICARE

## 2024-02-12 ENCOUNTER — APPOINTMENT (OUTPATIENT)
Dept: WOUND CARE | Facility: HOSPITAL | Age: 69
End: 2024-02-12
Payer: MEDICARE

## 2024-02-12 VITALS — RESPIRATION RATE: 20 BRPM | DIASTOLIC BLOOD PRESSURE: 82 MMHG | HEART RATE: 98 BPM | SYSTOLIC BLOOD PRESSURE: 138 MMHG

## 2024-02-12 DIAGNOSIS — S91.302A UNSPECIFIED OPEN WOUND, LEFT FOOT, INITIAL ENCOUNTER: ICD-10-CM

## 2024-02-12 DIAGNOSIS — Z98.890 OTHER SPECIFIED POSTPROCEDURAL STATES: Chronic | ICD-10-CM

## 2024-02-12 DIAGNOSIS — Z98.89 OTHER SPECIFIED POSTPROCEDURAL STATES: Chronic | ICD-10-CM

## 2024-02-12 DIAGNOSIS — Z90.710 ACQUIRED ABSENCE OF BOTH CERVIX AND UTERUS: Chronic | ICD-10-CM

## 2024-02-12 PROCEDURE — 11042 DBRDMT SUBQ TIS 1ST 20SQCM/<: CPT

## 2024-02-12 NOTE — REASON FOR VISIT
[Consultation] : a consultation visit [[employer] ] : provided by [employer]   [Time Spent: ____ minutes] : Total time spent using  services: [unfilled] minutes. The patient's primary language is not English thus required  services. [FreeTextEntry1] : Left and right foot [Interpreters_IDNumber] : [id number] [Interpreters_FullName] : xiomara [TWNoteComboBox1] : Citizen of [place]

## 2024-02-12 NOTE — PHYSICAL EXAM
[1+] : left 1+ [Please See PDF for [employer]] : Please See PDF for [employer]. [de-identified] : nad [de-identified] : non traumatic [de-identified] : supple [de-identified] : equal chest rise
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
## 2024-02-12 NOTE — PLAN
[FreeTextEntry1] : 1/31/24 Plan: Wash with soap and water/baby wash  Apply Medi Honey Cover with Adaptic/gauze/Nicolette Apply a small amount of Lidocaine cream for pain Optimization of nutrition. Offloading to the wound site. -----Wound supplies ordered via DME Patient given contact information to DME Lidocaine cream sent to pharmacy. Follow up appointment scheduled for two week No clinical sign of infection  2/12/24 Plan: Wash with soap and water/baby wash  Apply Medi Honey Cover with Adaptic/gauze/Nicolette Apply a small amount of Lidocaine cream for pain
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
## 2024-02-20 DIAGNOSIS — S91.302A UNSPECIFIED OPEN WOUND, LEFT FOOT, INITIAL ENCOUNTER: ICD-10-CM
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
## 2024-02-26 ENCOUNTER — APPOINTMENT (OUTPATIENT)
Dept: WOUND CARE | Facility: HOSPITAL | Age: 69
End: 2024-02-26

## 2024-02-28 ENCOUNTER — APPOINTMENT (OUTPATIENT)
Dept: WOUND CARE | Facility: HOSPITAL | Age: 69
End: 2024-02-28
Payer: MEDICARE

## 2024-02-28 ENCOUNTER — OUTPATIENT (OUTPATIENT)
Dept: OUTPATIENT SERVICES | Facility: HOSPITAL | Age: 69
LOS: 1 days | End: 2024-02-28
Payer: MEDICARE

## 2024-02-28 DIAGNOSIS — T14.90XA INJURY, UNSPECIFIED, INITIAL ENCOUNTER: ICD-10-CM

## 2024-02-28 PROCEDURE — 93970 EXTREMITY STUDY: CPT | Mod: 26

## 2024-02-28 PROCEDURE — 93923 UPR/LXTR ART STDY 3+ LVLS: CPT | Mod: 26

## 2024-02-28 PROCEDURE — 99213 OFFICE O/P EST LOW 20 MIN: CPT

## 2024-02-28 PROCEDURE — G0463: CPT

## 2024-02-28 NOTE — PHYSICAL EXAM
[1+] : left 1+ [Please See PDF for [employer]] : Please See PDF for [employer]. [de-identified] : nad [de-identified] : non traumatic [de-identified] : supple [de-identified] : equal chest rise
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
## 2024-02-28 NOTE — REASON FOR VISIT
[Consultation] : a consultation visit [[employer] ] : provided by [employer]   [FreeTextEntry1] : Left and right foot [Interpreters_IDNumber] : [id number] [Interpreters_FullName] : xiomara [TWNoteComboBox1] : [place]an

## 2024-03-04 ENCOUNTER — NON-APPOINTMENT (OUTPATIENT)
Age: 69
End: 2024-03-04
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
## 2024-03-12 DIAGNOSIS — T14.90XA INJURY, UNSPECIFIED, INITIAL ENCOUNTER: ICD-10-CM

## 2024-04-23 ENCOUNTER — APPOINTMENT (OUTPATIENT)
Dept: INTERNAL MEDICINE | Facility: CLINIC | Age: 69
End: 2024-04-23
Payer: MEDICARE

## 2024-04-23 VITALS
DIASTOLIC BLOOD PRESSURE: 65 MMHG | HEART RATE: 92 BPM | SYSTOLIC BLOOD PRESSURE: 104 MMHG | BODY MASS INDEX: 25.58 KG/M2 | OXYGEN SATURATION: 98 % | HEIGHT: 62 IN | TEMPERATURE: 97.4 F | WEIGHT: 139 LBS

## 2024-04-23 DIAGNOSIS — G89.29 OTHER CHRONIC PAIN: ICD-10-CM

## 2024-04-23 DIAGNOSIS — M65.30 TRIGGER FINGER, UNSPECIFIED FINGER: ICD-10-CM

## 2024-04-23 PROCEDURE — 99204 OFFICE O/P NEW MOD 45 MIN: CPT

## 2024-04-23 RX ORDER — CALCIUM CARBONATE/VITAMIN D3 600 MG-10
TABLET ORAL
Refills: 0 | Status: ACTIVE | COMMUNITY

## 2024-04-23 RX ORDER — MAGNESIUM 100 MG
100 TABLET ORAL DAILY
Refills: 0 | Status: ACTIVE | COMMUNITY

## 2024-04-23 RX ORDER — FAMOTIDINE 40 MG/1
40 TABLET, FILM COATED ORAL
Refills: 0 | Status: ACTIVE | COMMUNITY

## 2024-04-23 RX ORDER — TRAMADOL HYDROCHLORIDE 50 MG/1
50 TABLET, COATED ORAL
Refills: 0 | Status: ACTIVE | COMMUNITY

## 2024-04-23 RX ORDER — ZOSTER VACCINE RECOMBINANT, ADJUVANTED 50 MCG/0.5
50 KIT INTRAMUSCULAR
Qty: 1 | Refills: 0 | Status: DISCONTINUED | COMMUNITY
Start: 2024-04-23 | End: 2024-04-23

## 2024-04-23 RX ORDER — PREGABALIN 100 MG/1
100 CAPSULE ORAL 3 TIMES DAILY
Refills: 0 | Status: ACTIVE | COMMUNITY

## 2024-04-23 RX ORDER — PNEUMOCOCCAL 20-VALENT CONJUGATE VACCINE 2.2; 2.2; 2.2; 2.2; 2.2; 2.2; 2.2; 2.2; 2.2; 2.2; 2.2; 2.2; 2.2; 2.2; 2.2; 2.2; 4.4; 2.2; 2.2; 2.2 UG/.5ML; UG/.5ML; UG/.5ML; UG/.5ML; UG/.5ML; UG/.5ML; UG/.5ML; UG/.5ML; UG/.5ML; UG/.5ML; UG/.5ML; UG/.5ML; UG/.5ML; UG/.5ML; UG/.5ML; UG/.5ML; UG/.5ML; UG/.5ML; UG/.5ML; UG/.5ML
INJECTION, SUSPENSION INTRAMUSCULAR
Qty: 1 | Refills: 0 | Status: DISCONTINUED | COMMUNITY
Start: 2024-04-23 | End: 2024-04-23

## 2024-04-23 NOTE — HISTORY OF PRESENT ILLNESS
[FreeTextEntry8] : 68 F presenting to establish care. Her last CPE was November 2023. She is originally from [place]. She has several medical conditions including chronic pain (back/right leg/hand pain) but also widespread. She was told by her prior PCP that her cholesterol was elevated and recommended starting a statin to lower cholesterol however she declines and changed her lifestyle instead. She does not eat sugars, milk products or red meat. In addition, she reports going for a back surgery last year to improve the sciatica. She currently sees a pain management specialist who has prescribed NSAIDs (ibuprofen/meloxicam), tramadol, pregabalin as well as several injections. She has not gone to PT due to traveling recently.

## 2024-04-23 NOTE — HISTORY OF PRESENT ILLNESS
[FreeTextEntry8] : 68 F presenting to establish care. Her last CPE was November 2023. She is originally from [place]. She has several medical conditions including chronic pain (back/right leg/hand pain) but also widespread. She was told by her prior PCP that her cholesterol was elevated and recommended starting a statin to lower cholesterol however she declines and changed her lifestyle instead. She does not eat sugars, milk products or red meat. In addition, she reports going for a back surgery last year to improve the sciatica. She currently sees a pain management specialist who has prescribed NSAIDs (ibuprofen/meloxicam), tramadol, pregabalin as well as several injections. She has not gone to PT due to traveling recently.
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
## 2024-04-23 NOTE — PLAN
[FreeTextEntry1] : #Chronic pain - Patient provided with referral to see Rheumatologist for evaluation of possible rheumatologic disease as well as physical therapy.  - Ibuprofen 800mg PO TID PRN prescribed for pain. Advised patient to not take with Meloxicam.

## 2024-04-23 NOTE — PHYSICAL EXAM
[Normal] : no acute distress, well nourished, well developed and well-appearing [No Respiratory Distress] : no respiratory distress  [de-identified] : Pain reproduced with right leg flexion

## 2024-04-23 NOTE — PHYSICAL EXAM
[Normal] : no acute distress, well nourished, well developed and well-appearing [No Respiratory Distress] : no respiratory distress  [de-identified] : Pain reproduced with right leg flexion

## 2024-04-23 NOTE — HISTORY OF PRESENT ILLNESS
[FreeTextEntry8] : 68 F presenting to establish care. Her last CPE was November 2023. She is originally from [place]. She has several medical conditions including chronic pain (back/right leg/hand pain) but also widespread. She was told by her prior PCP that her cholesterol was elevated and recommended starting a statin to lower cholesterol however she declines and changed her lifestyle instead. She does not eat sugars, milk products or red meat. In addition, she reports going for a back surgery last year to improve the sciatica. She currently sees a pain management specialist who has prescribed NSAIDs (ibuprofen/meloxicam), tramadol, pregabalin as well as several injections. She has not gone to PT due to traveling recently.

## 2024-04-23 NOTE — PHYSICAL EXAM
[Normal] : no acute distress, well nourished, well developed and well-appearing [No Respiratory Distress] : no respiratory distress  [de-identified] : Pain reproduced with right leg flexion

## 2024-05-21 ENCOUNTER — RX RENEWAL (OUTPATIENT)
Age: 69
End: 2024-05-21

## 2024-05-21 RX ORDER — IBUPROFEN 800 MG/1
800 TABLET, FILM COATED ORAL 3 TIMES DAILY
Qty: 90 | Refills: 0 | Status: ACTIVE | COMMUNITY
Start: 2024-04-23 | End: 1900-01-01

## 2024-06-04 NOTE — ED ADULT NURSE NOTE - NSSUHOSCREENINGYN_ED_ALL_ED
SURVEY:     You may be receiving a survey from [facility] [employer] regarding your visit today.     Please complete the survey to enable us to provide the highest quality of care to you and your family.     If you cannot score us a very good on any question, please call the office to discuss how we could have made your experience a very good one.     Thank you,    Ronaldo Chau, APRN-CNP  Sahara Enriquez, APRN-CNP  Akilah, LPN  Nikki, CMA  Conner, CMA  Kiara, CMA  Lisa, PCA  Melody, CMA  Danielle, PM    
Yes - the patient is able to be screened

## 2024-06-26 ENCOUNTER — APPOINTMENT (OUTPATIENT)
Dept: RHEUMATOLOGY | Facility: CLINIC | Age: 69
End: 2024-06-26

## 2024-07-17 ENCOUNTER — APPOINTMENT (OUTPATIENT)
Dept: INTERNAL MEDICINE | Facility: CLINIC | Age: 69
End: 2024-07-17
Payer: MEDICARE

## 2024-07-17 VITALS
HEIGHT: 62 IN | BODY MASS INDEX: 25.4 KG/M2 | HEART RATE: 71 BPM | DIASTOLIC BLOOD PRESSURE: 67 MMHG | WEIGHT: 138 LBS | SYSTOLIC BLOOD PRESSURE: 99 MMHG | OXYGEN SATURATION: 98 %

## 2024-07-17 VITALS — DIASTOLIC BLOOD PRESSURE: 70 MMHG | SYSTOLIC BLOOD PRESSURE: 125 MMHG

## 2024-07-17 DIAGNOSIS — R52 PAIN, UNSPECIFIED: ICD-10-CM

## 2024-07-17 PROCEDURE — 36415 COLL VENOUS BLD VENIPUNCTURE: CPT

## 2024-07-17 PROCEDURE — G2211 COMPLEX E/M VISIT ADD ON: CPT

## 2024-07-17 PROCEDURE — 99214 OFFICE O/P EST MOD 30 MIN: CPT

## 2024-07-19 ENCOUNTER — APPOINTMENT (OUTPATIENT)
Dept: INTERNAL MEDICINE | Facility: CLINIC | Age: 69
End: 2024-07-19

## 2024-07-24 ENCOUNTER — APPOINTMENT (OUTPATIENT)
Dept: INTERNAL MEDICINE | Facility: CLINIC | Age: 69
End: 2024-07-24
Payer: MEDICARE

## 2024-07-24 DIAGNOSIS — R74.8 ABNORMAL LEVELS OF OTHER SERUM ENZYMES: ICD-10-CM

## 2024-07-24 DIAGNOSIS — R70.0 ELEVATED ERYTHROCYTE SEDIMENTATION RATE: ICD-10-CM

## 2024-07-24 LAB
ALBUMIN SERPL ELPH-MCNC: 4.7 G/DL
ALP BLD-CCNC: 72 U/L
ALT SERPL-CCNC: 21 U/L
ANA SER IF-ACNC: NEGATIVE
ANION GAP SERPL CALC-SCNC: 11 MMOL/L
AST SERPL-CCNC: 31 U/L
BASOPHILS # BLD AUTO: 0.03 K/UL
BASOPHILS NFR BLD AUTO: 0.5 %
BILIRUB SERPL-MCNC: 0.2 MG/DL
BUN SERPL-MCNC: 20 MG/DL
CALCIUM SERPL-MCNC: 9.9 MG/DL
CHLORIDE SERPL-SCNC: 104 MMOL/L
CK SERPL-CCNC: 260 U/L
CO2 SERPL-SCNC: 24 MMOL/L
CREAT SERPL-MCNC: 0.64 MG/DL
EGFR: 95 ML/MIN/1.73M2
EOSINOPHIL # BLD AUTO: 0.12 K/UL
EOSINOPHIL NFR BLD AUTO: 1.9 %
ERYTHROCYTE [SEDIMENTATION RATE] IN BLOOD BY WESTERGREN METHOD: 31 MM/HR
GLUCOSE SERPL-MCNC: 103 MG/DL
HCT VFR BLD CALC: 39.6 %
HGB BLD-MCNC: 12.4 G/DL
IMM GRANULOCYTES NFR BLD AUTO: 0 %
LYMPHOCYTES # BLD AUTO: 1.42 K/UL
LYMPHOCYTES NFR BLD AUTO: 21.9 %
MAN DIFF?: NORMAL
MCHC RBC-ENTMCNC: 31.3 GM/DL
MCHC RBC-ENTMCNC: 31.4 PG
MCV RBC AUTO: 100.3 FL
MONOCYTES # BLD AUTO: 0.55 K/UL
MONOCYTES NFR BLD AUTO: 8.5 %
NEUTROPHILS # BLD AUTO: 4.35 K/UL
NEUTROPHILS NFR BLD AUTO: 67.2 %
PLATELET # BLD AUTO: 220 K/UL
POTASSIUM SERPL-SCNC: 5.5 MMOL/L
PROT SERPL-MCNC: 7.4 G/DL
RBC # BLD: 3.95 M/UL
RBC # FLD: 12.8 %
SODIUM SERPL-SCNC: 139 MMOL/L
WBC # FLD AUTO: 6.47 K/UL

## 2024-07-24 PROCEDURE — 99441: CPT

## 2024-07-24 NOTE — REVIEW OF SYSTEMS
[Constipation] : constipation [Joint Pain] : joint pain [Muscle Pain] : muscle pain [Negative] : Constitutional

## 2024-07-26 NOTE — HISTORY OF PRESENT ILLNESS
[Home] : at home, [unfilled] , at the time of the visit. [Medical Office: ([facility])___] : at the medical office located in  [Verbal consent obtained from patient] : the patient, [unfilled] [de-identified] : 69 F with chronic pain and constipation is presenting for a follow up visit. Recent labs are significant for ESR of 31 and CPK of 260. She continues to have generalized pain.

## 2024-07-26 NOTE — HISTORY OF PRESENT ILLNESS
[Home] : at home, [unfilled] , at the time of the visit. [Medical Office: ([facility])___] : at the medical office located in  [Verbal consent obtained from patient] : the patient, [unfilled] [de-identified] : 69 F with chronic pain and constipation is presenting for a follow up visit. Recent labs are significant for ESR of 31 and CPK of 260. She continues to have generalized pain.

## 2024-10-08 ENCOUNTER — NON-APPOINTMENT (OUTPATIENT)
Age: 69
End: 2024-10-08

## 2024-10-08 ENCOUNTER — APPOINTMENT (OUTPATIENT)
Dept: INTERNAL MEDICINE | Facility: CLINIC | Age: 69
End: 2024-10-08
Payer: MEDICARE

## 2024-10-08 VITALS
WEIGHT: 137 LBS | HEIGHT: 62 IN | DIASTOLIC BLOOD PRESSURE: 73 MMHG | SYSTOLIC BLOOD PRESSURE: 113 MMHG | TEMPERATURE: 98.7 F | HEART RATE: 83 BPM | OXYGEN SATURATION: 99 % | BODY MASS INDEX: 25.21 KG/M2

## 2024-10-08 DIAGNOSIS — Z01.818 ENCOUNTER FOR OTHER PREPROCEDURAL EXAMINATION: ICD-10-CM

## 2024-10-08 PROCEDURE — G2211 COMPLEX E/M VISIT ADD ON: CPT

## 2024-10-08 PROCEDURE — 36415 COLL VENOUS BLD VENIPUNCTURE: CPT

## 2024-10-08 PROCEDURE — 99214 OFFICE O/P EST MOD 30 MIN: CPT

## 2024-10-08 PROCEDURE — 93000 ELECTROCARDIOGRAM COMPLETE: CPT

## 2024-10-09 LAB
ALBUMIN SERPL ELPH-MCNC: 4.3 G/DL
ALP BLD-CCNC: 63 U/L
ALT SERPL-CCNC: 17 U/L
ANION GAP SERPL CALC-SCNC: 12 MMOL/L
AST SERPL-CCNC: 23 U/L
BASOPHILS # BLD AUTO: 0.02 K/UL
BASOPHILS NFR BLD AUTO: 0.2 %
BILIRUB SERPL-MCNC: 0.2 MG/DL
BUN SERPL-MCNC: 15 MG/DL
CALCIUM SERPL-MCNC: 9.3 MG/DL
CHLORIDE SERPL-SCNC: 103 MMOL/L
CO2 SERPL-SCNC: 25 MMOL/L
CREAT SERPL-MCNC: 0.7 MG/DL
EGFR: 93 ML/MIN/1.73M2
EOSINOPHIL # BLD AUTO: 0.03 K/UL
EOSINOPHIL NFR BLD AUTO: 0.3 %
GLUCOSE SERPL-MCNC: 106 MG/DL
HCT VFR BLD CALC: 36.7 %
HGB BLD-MCNC: 12.3 G/DL
IMM GRANULOCYTES NFR BLD AUTO: 0.7 %
LYMPHOCYTES # BLD AUTO: 2.85 K/UL
LYMPHOCYTES NFR BLD AUTO: 31.6 %
MAN DIFF?: NORMAL
MCHC RBC-ENTMCNC: 32.5 PG
MCHC RBC-ENTMCNC: 33.5 GM/DL
MCV RBC AUTO: 96.8 FL
MONOCYTES # BLD AUTO: 1 K/UL
MONOCYTES NFR BLD AUTO: 11.1 %
NEUTROPHILS # BLD AUTO: 5.07 K/UL
NEUTROPHILS NFR BLD AUTO: 56.1 %
PLATELET # BLD AUTO: 233 K/UL
POTASSIUM SERPL-SCNC: 4.3 MMOL/L
PROT SERPL-MCNC: 7 G/DL
RBC # BLD: 3.79 M/UL
RBC # FLD: 12.5 %
SODIUM SERPL-SCNC: 140 MMOL/L
WBC # FLD AUTO: 9.03 K/UL

## 2024-11-12 ENCOUNTER — APPOINTMENT (OUTPATIENT)
Dept: INTERNAL MEDICINE | Facility: CLINIC | Age: 69
End: 2024-11-12
Payer: MEDICARE

## 2024-11-12 VITALS
BODY MASS INDEX: 25.58 KG/M2 | DIASTOLIC BLOOD PRESSURE: 75 MMHG | TEMPERATURE: 96.4 F | HEIGHT: 62 IN | OXYGEN SATURATION: 100 % | WEIGHT: 139 LBS | SYSTOLIC BLOOD PRESSURE: 117 MMHG | HEART RATE: 66 BPM

## 2024-11-12 DIAGNOSIS — G89.29 DORSALGIA, UNSPECIFIED: ICD-10-CM

## 2024-11-12 DIAGNOSIS — Z00.00 ENCOUNTER FOR GENERAL ADULT MEDICAL EXAMINATION W/OUT ABNORMAL FINDINGS: ICD-10-CM

## 2024-11-12 DIAGNOSIS — G89.29 CERVICALGIA: ICD-10-CM

## 2024-11-12 DIAGNOSIS — Z23 ENCOUNTER FOR IMMUNIZATION: ICD-10-CM

## 2024-11-12 DIAGNOSIS — M54.9 DORSALGIA, UNSPECIFIED: ICD-10-CM

## 2024-11-12 DIAGNOSIS — M54.2 CERVICALGIA: ICD-10-CM

## 2024-11-12 DIAGNOSIS — R74.8 ABNORMAL LEVELS OF OTHER SERUM ENZYMES: ICD-10-CM

## 2024-11-12 PROCEDURE — 90472 IMMUNIZATION ADMIN EACH ADD: CPT

## 2024-11-12 PROCEDURE — 99214 OFFICE O/P EST MOD 30 MIN: CPT | Mod: 25

## 2024-11-12 PROCEDURE — G0008: CPT

## 2024-11-12 PROCEDURE — 90750 HZV VACC RECOMBINANT IM: CPT

## 2024-11-12 PROCEDURE — G0439: CPT

## 2024-11-12 PROCEDURE — 90662 IIV NO PRSV INCREASED AG IM: CPT

## 2024-11-12 PROCEDURE — 36415 COLL VENOUS BLD VENIPUNCTURE: CPT

## 2024-11-12 PROCEDURE — ZZZZZ: CPT

## 2024-11-12 RX ORDER — IBUPROFEN 400 MG/1
400 TABLET, FILM COATED ORAL 3 TIMES DAILY
Qty: 21 | Refills: 0 | Status: ACTIVE | COMMUNITY
Start: 2024-11-12 | End: 1900-01-01

## 2024-11-13 ENCOUNTER — APPOINTMENT (OUTPATIENT)
Dept: INTERNAL MEDICINE | Facility: CLINIC | Age: 69
End: 2024-11-13

## 2024-11-13 LAB
25(OH)D3 SERPL-MCNC: 52.7 NG/ML
ALBUMIN SERPL ELPH-MCNC: 4.4 G/DL
ALP BLD-CCNC: 53 U/L
ALT SERPL-CCNC: 23 U/L
ANION GAP SERPL CALC-SCNC: 11 MMOL/L
APPEARANCE: CLEAR
AST SERPL-CCNC: 29 U/L
BASOPHILS # BLD AUTO: 0.04 K/UL
BASOPHILS NFR BLD AUTO: 0.4 %
BILIRUB SERPL-MCNC: 0.6 MG/DL
BILIRUBIN URINE: NEGATIVE
BLOOD URINE: NEGATIVE
BUN SERPL-MCNC: 19 MG/DL
CALCIUM SERPL-MCNC: 9.8 MG/DL
CHLORIDE SERPL-SCNC: 102 MMOL/L
CHOLEST SERPL-MCNC: 211 MG/DL
CO2 SERPL-SCNC: 26 MMOL/L
COLOR: YELLOW
CREAT SERPL-MCNC: 0.85 MG/DL
EGFR: 74 ML/MIN/1.73M2
EOSINOPHIL # BLD AUTO: 0.08 K/UL
EOSINOPHIL NFR BLD AUTO: 0.9 %
ESTIMATED AVERAGE GLUCOSE: 111 MG/DL
GLUCOSE QUALITATIVE U: NEGATIVE MG/DL
GLUCOSE SERPL-MCNC: 93 MG/DL
HBA1C MFR BLD HPLC: 5.5 %
HCT VFR BLD CALC: 39.9 %
HCV AB SER QL: NONREACTIVE
HCV S/CO RATIO: 0.14 S/CO
HDLC SERPL-MCNC: 54 MG/DL
HGB BLD-MCNC: 12.8 G/DL
HIV1+2 AB SPEC QL IA.RAPID: NONREACTIVE
IMM GRANULOCYTES NFR BLD AUTO: 0.3 %
KETONES URINE: NEGATIVE MG/DL
LDLC SERPL CALC-MCNC: 133 MG/DL
LEUKOCYTE ESTERASE URINE: NEGATIVE
LYMPHOCYTES # BLD AUTO: 3.38 K/UL
LYMPHOCYTES NFR BLD AUTO: 36 %
MAN DIFF?: NORMAL
MCHC RBC-ENTMCNC: 32.1 G/DL
MCHC RBC-ENTMCNC: 32.2 PG
MCV RBC AUTO: 100.5 FL
MONOCYTES # BLD AUTO: 0.69 K/UL
MONOCYTES NFR BLD AUTO: 7.3 %
NEUTROPHILS # BLD AUTO: 5.17 K/UL
NEUTROPHILS NFR BLD AUTO: 55.1 %
NITRITE URINE: NEGATIVE
NONHDLC SERPL-MCNC: 157 MG/DL
PH URINE: 6
PLATELET # BLD AUTO: 224 K/UL
POTASSIUM SERPL-SCNC: 4.5 MMOL/L
PROT SERPL-MCNC: 7.2 G/DL
PROTEIN URINE: NEGATIVE MG/DL
RBC # BLD: 3.97 M/UL
RBC # FLD: 13.4 %
SODIUM SERPL-SCNC: 138 MMOL/L
SPECIFIC GRAVITY URINE: 1.01
TRIGL SERPL-MCNC: 138 MG/DL
TSH SERPL-ACNC: 1.27 UIU/ML
UROBILINOGEN URINE: 0.2 MG/DL
VIT B12 SERPL-MCNC: 935 PG/ML
WBC # FLD AUTO: 9.39 K/UL

## 2024-11-14 ENCOUNTER — APPOINTMENT (OUTPATIENT)
Dept: INTERNAL MEDICINE | Facility: CLINIC | Age: 69
End: 2024-11-14
Payer: MEDICARE

## 2024-11-14 VITALS
HEART RATE: 68 BPM | HEIGHT: 62 IN | SYSTOLIC BLOOD PRESSURE: 104 MMHG | WEIGHT: 139 LBS | DIASTOLIC BLOOD PRESSURE: 65 MMHG | OXYGEN SATURATION: 97 % | BODY MASS INDEX: 25.58 KG/M2

## 2024-11-14 DIAGNOSIS — E78.2 MIXED HYPERLIPIDEMIA: ICD-10-CM

## 2024-11-14 DIAGNOSIS — Z71.1 PERSON WITH FEARED HEALTH COMPLAINT IN WHOM NO DIAGNOSIS IS MADE: ICD-10-CM

## 2024-11-14 PROCEDURE — G2211 COMPLEX E/M VISIT ADD ON: CPT

## 2024-11-14 PROCEDURE — 99214 OFFICE O/P EST MOD 30 MIN: CPT

## 2024-12-02 ENCOUNTER — NON-APPOINTMENT (OUTPATIENT)
Age: 69
End: 2024-12-02

## 2024-12-04 ENCOUNTER — APPOINTMENT (OUTPATIENT)
Dept: INTERNAL MEDICINE | Facility: CLINIC | Age: 69
End: 2024-12-04
Payer: MEDICARE

## 2024-12-04 VITALS
HEIGHT: 62 IN | HEART RATE: 74 BPM | BODY MASS INDEX: 25.4 KG/M2 | SYSTOLIC BLOOD PRESSURE: 99 MMHG | WEIGHT: 138 LBS | TEMPERATURE: 97 F | DIASTOLIC BLOOD PRESSURE: 63 MMHG

## 2024-12-04 DIAGNOSIS — M79.671 PAIN IN RIGHT FOOT: ICD-10-CM

## 2024-12-04 DIAGNOSIS — H02.849 EDEMA OF UNSPECIFIED EYE, UNSPECIFIED EYELID: ICD-10-CM

## 2024-12-04 PROCEDURE — 99214 OFFICE O/P EST MOD 30 MIN: CPT

## 2024-12-04 PROCEDURE — G2211 COMPLEX E/M VISIT ADD ON: CPT

## 2024-12-04 RX ORDER — MELOXICAM 7.5 MG/1
7.5 TABLET ORAL
Refills: 0 | Status: ACTIVE | COMMUNITY

## 2024-12-12 ENCOUNTER — APPOINTMENT (OUTPATIENT)
Dept: INTERNAL MEDICINE | Facility: CLINIC | Age: 69
End: 2024-12-12
Payer: MEDICARE

## 2024-12-12 ENCOUNTER — APPOINTMENT (OUTPATIENT)
Dept: CARDIOLOGY | Facility: CLINIC | Age: 69
End: 2024-12-12
Payer: MEDICARE

## 2024-12-12 VITALS
HEIGHT: 62 IN | WEIGHT: 138 LBS | SYSTOLIC BLOOD PRESSURE: 128 MMHG | DIASTOLIC BLOOD PRESSURE: 77 MMHG | BODY MASS INDEX: 25.4 KG/M2 | HEART RATE: 69 BPM | TEMPERATURE: 97.6 F

## 2024-12-12 DIAGNOSIS — R06.02 SHORTNESS OF BREATH: ICD-10-CM

## 2024-12-12 DIAGNOSIS — R93.1 ABNORMAL FINDINGS ON DIAGNOSTIC IMAGING OF HEART AND CORONARY CIRCULATION: ICD-10-CM

## 2024-12-12 DIAGNOSIS — R91.1 SOLITARY PULMONARY NODULE: ICD-10-CM

## 2024-12-12 DIAGNOSIS — E78.5 HYPERLIPIDEMIA, UNSPECIFIED: ICD-10-CM

## 2024-12-12 DIAGNOSIS — S92.901S UNSPECIFIED FRACTURE OF RIGHT FOOT, SEQUELA: ICD-10-CM

## 2024-12-12 PROBLEM — M48.061 SPINAL STENOSIS OF LUMBAR REGION, UNSPECIFIED WHETHER NEUROGENIC CLAUDICATION PRESENT: Status: ACTIVE | Noted: 2024-12-12

## 2024-12-12 PROCEDURE — 99204 OFFICE O/P NEW MOD 45 MIN: CPT

## 2024-12-12 PROCEDURE — 99443: CPT

## 2024-12-12 RX ORDER — ATORVASTATIN CALCIUM 20 MG/1
20 TABLET, FILM COATED ORAL
Qty: 90 | Refills: 3 | Status: ACTIVE | COMMUNITY
Start: 2024-12-12 | End: 1900-01-01

## 2024-12-16 ENCOUNTER — APPOINTMENT (OUTPATIENT)
Dept: ORTHOPEDIC SURGERY | Facility: CLINIC | Age: 69
End: 2024-12-16
Payer: MEDICARE

## 2024-12-16 VITALS — HEIGHT: 62 IN | WEIGHT: 138 LBS | BODY MASS INDEX: 25.4 KG/M2

## 2024-12-16 DIAGNOSIS — M48.061 SPINAL STENOSIS, LUMBAR REGION WITHOUT NEUROGENIC CLAUDICATION: ICD-10-CM

## 2024-12-16 DIAGNOSIS — G89.29 DORSALGIA, UNSPECIFIED: ICD-10-CM

## 2024-12-16 DIAGNOSIS — M54.9 DORSALGIA, UNSPECIFIED: ICD-10-CM

## 2024-12-16 DIAGNOSIS — M54.2 CERVICALGIA: ICD-10-CM

## 2024-12-16 DIAGNOSIS — G89.29 CERVICALGIA: ICD-10-CM

## 2024-12-16 PROCEDURE — 72100 X-RAY EXAM L-S SPINE 2/3 VWS: CPT

## 2024-12-16 PROCEDURE — 99203 OFFICE O/P NEW LOW 30 MIN: CPT

## 2025-01-07 ENCOUNTER — APPOINTMENT (OUTPATIENT)
Dept: CARDIOLOGY | Facility: CLINIC | Age: 70
End: 2025-01-07
Payer: MEDICARE

## 2025-01-07 PROCEDURE — 93306 TTE W/DOPPLER COMPLETE: CPT

## 2025-01-07 PROCEDURE — 93015 CV STRESS TEST SUPVJ I&R: CPT

## 2025-01-09 ENCOUNTER — APPOINTMENT (OUTPATIENT)
Dept: CARDIOLOGY | Facility: CLINIC | Age: 70
End: 2025-01-09
Payer: MEDICARE

## 2025-01-09 ENCOUNTER — NON-APPOINTMENT (OUTPATIENT)
Age: 70
End: 2025-01-09

## 2025-01-09 VITALS
HEIGHT: 62 IN | HEART RATE: 67 BPM | DIASTOLIC BLOOD PRESSURE: 75 MMHG | WEIGHT: 138 LBS | BODY MASS INDEX: 25.4 KG/M2 | SYSTOLIC BLOOD PRESSURE: 119 MMHG

## 2025-01-09 DIAGNOSIS — R06.02 SHORTNESS OF BREATH: ICD-10-CM

## 2025-01-09 DIAGNOSIS — E78.5 HYPERLIPIDEMIA, UNSPECIFIED: ICD-10-CM

## 2025-01-09 DIAGNOSIS — I35.1 NONRHEUMATIC AORTIC (VALVE) INSUFFICIENCY: ICD-10-CM

## 2025-01-09 DIAGNOSIS — I25.10 ATHEROSCLEROTIC HEART DISEASE OF NATIVE CORONARY ARTERY W/OUT ANGINA PECTORIS: ICD-10-CM

## 2025-01-09 PROCEDURE — 99214 OFFICE O/P EST MOD 30 MIN: CPT

## 2025-02-04 ENCOUNTER — APPOINTMENT (OUTPATIENT)
Dept: INTERNAL MEDICINE | Facility: CLINIC | Age: 70
End: 2025-02-04
Payer: MEDICARE

## 2025-02-04 VITALS
SYSTOLIC BLOOD PRESSURE: 102 MMHG | WEIGHT: 139 LBS | HEART RATE: 69 BPM | BODY MASS INDEX: 25.58 KG/M2 | DIASTOLIC BLOOD PRESSURE: 70 MMHG | HEIGHT: 62 IN | TEMPERATURE: 97.4 F

## 2025-02-04 DIAGNOSIS — Z12.11 ENCOUNTER FOR SCREENING FOR MALIGNANT NEOPLASM OF COLON: ICD-10-CM

## 2025-02-04 DIAGNOSIS — M79.673 PAIN IN UNSPECIFIED FOOT: ICD-10-CM

## 2025-02-04 DIAGNOSIS — R51.9 HEADACHE, UNSPECIFIED: ICD-10-CM

## 2025-02-04 DIAGNOSIS — M85.80 OTHER SPECIFIED DISORDERS OF BONE DENSITY AND STRUCTURE, UNSPECIFIED SITE: ICD-10-CM

## 2025-02-04 DIAGNOSIS — R52 PAIN, UNSPECIFIED: ICD-10-CM

## 2025-02-04 DIAGNOSIS — Z23 ENCOUNTER FOR IMMUNIZATION: ICD-10-CM

## 2025-02-04 DIAGNOSIS — Z12.31 ENCOUNTER FOR SCREENING MAMMOGRAM FOR MALIGNANT NEOPLASM OF BREAST: ICD-10-CM

## 2025-02-04 PROCEDURE — 90677 PCV20 VACCINE IM: CPT

## 2025-02-04 PROCEDURE — 90472 IMMUNIZATION ADMIN EACH ADD: CPT

## 2025-02-04 PROCEDURE — G0009: CPT

## 2025-02-04 PROCEDURE — G0537: CPT

## 2025-02-04 PROCEDURE — 90750 HZV VACC RECOMBINANT IM: CPT

## 2025-02-04 PROCEDURE — 99214 OFFICE O/P EST MOD 30 MIN: CPT | Mod: 25

## 2025-02-04 RX ORDER — NAPROXEN 500 MG/1
500 TABLET, DELAYED RELEASE ORAL
Qty: 90 | Refills: 0 | Status: ACTIVE | COMMUNITY
Start: 2025-02-04 | End: 1900-01-01

## 2025-02-13 ENCOUNTER — APPOINTMENT (OUTPATIENT)
Dept: INTERNAL MEDICINE | Facility: CLINIC | Age: 70
End: 2025-02-13
Payer: MEDICARE

## 2025-02-13 VITALS
WEIGHT: 139 LBS | HEIGHT: 62 IN | SYSTOLIC BLOOD PRESSURE: 100 MMHG | TEMPERATURE: 98.3 F | HEART RATE: 76 BPM | BODY MASS INDEX: 25.58 KG/M2 | OXYGEN SATURATION: 99 % | DIASTOLIC BLOOD PRESSURE: 64 MMHG

## 2025-02-13 DIAGNOSIS — R07.89 OTHER CHEST PAIN: ICD-10-CM

## 2025-02-13 DIAGNOSIS — J02.9 ACUTE PHARYNGITIS, UNSPECIFIED: ICD-10-CM

## 2025-02-13 LAB
FLUAV SPEC QL CULT: NEGATIVE
FLUBV AG SPEC QL IA: NEGATIVE
S PYO AG SPEC QL IA: NEGATIVE
SARS-COV-2 AG RESP QL IA.RAPID: NEGATIVE

## 2025-02-13 PROCEDURE — 87804 INFLUENZA ASSAY W/OPTIC: CPT | Mod: QW

## 2025-02-13 PROCEDURE — 99214 OFFICE O/P EST MOD 30 MIN: CPT

## 2025-02-13 PROCEDURE — G2211 COMPLEX E/M VISIT ADD ON: CPT

## 2025-02-13 PROCEDURE — 87880 STREP A ASSAY W/OPTIC: CPT | Mod: QW

## 2025-02-13 PROCEDURE — 87811 SARS-COV-2 COVID19 W/OPTIC: CPT | Mod: QW

## 2025-02-13 RX ORDER — AMOXICILLIN AND CLAVULANATE POTASSIUM 875; 125 MG/1; MG/1
875-125 TABLET, COATED ORAL
Qty: 14 | Refills: 0 | Status: ACTIVE | COMMUNITY
Start: 2025-02-13 | End: 1900-01-01

## 2025-02-13 RX ORDER — ALBUTEROL SULFATE 90 UG/1
108 (90 BASE) INHALANT RESPIRATORY (INHALATION)
Qty: 1 | Refills: 3 | Status: ACTIVE | COMMUNITY
Start: 2025-02-13 | End: 1900-01-01

## 2025-02-13 RX ORDER — GUAIFENESIN 600 MG/1
600 TABLET, EXTENDED RELEASE ORAL TWICE DAILY
Qty: 28 | Refills: 0 | Status: ACTIVE | COMMUNITY
Start: 2025-02-13 | End: 1900-01-01

## 2025-02-14 ENCOUNTER — NON-APPOINTMENT (OUTPATIENT)
Age: 70
End: 2025-02-14

## 2025-02-14 LAB
INFLUENZA A RESULT: NOT DETECTED
INFLUENZA B RESULT: NOT DETECTED
RESP SYN VIRUS RESULT: NOT DETECTED
SARS-COV-2 RESULT: NOT DETECTED

## 2025-02-18 ENCOUNTER — APPOINTMENT (OUTPATIENT)
Dept: INTERNAL MEDICINE | Facility: CLINIC | Age: 70
End: 2025-02-18

## 2025-03-21 ENCOUNTER — APPOINTMENT (OUTPATIENT)
Dept: INTERNAL MEDICINE | Facility: CLINIC | Age: 70
End: 2025-03-21
Payer: MEDICARE

## 2025-03-21 VITALS
OXYGEN SATURATION: 96 % | HEIGHT: 62 IN | HEART RATE: 73 BPM | TEMPERATURE: 97.1 F | DIASTOLIC BLOOD PRESSURE: 70 MMHG | SYSTOLIC BLOOD PRESSURE: 106 MMHG

## 2025-03-21 DIAGNOSIS — E78.2 MIXED HYPERLIPIDEMIA: ICD-10-CM

## 2025-03-21 DIAGNOSIS — R09.89 OTHER SPECIFIED SYMPTOMS AND SIGNS INVOLVING THE CIRCULATORY AND RESPIRATORY SYSTEMS: ICD-10-CM

## 2025-03-21 LAB
ALBUMIN SERPL ELPH-MCNC: 4.2 G/DL
ALP BLD-CCNC: 71 U/L
ALT SERPL-CCNC: 17 U/L
ANION GAP SERPL CALC-SCNC: 12 MMOL/L
AST SERPL-CCNC: 26 U/L
BILIRUB SERPL-MCNC: 0.2 MG/DL
BUN SERPL-MCNC: 18 MG/DL
CALCIUM SERPL-MCNC: 9.3 MG/DL
CHLORIDE SERPL-SCNC: 104 MMOL/L
CHOLEST SERPL-MCNC: 161 MG/DL
CO2 SERPL-SCNC: 25 MMOL/L
CREAT SERPL-MCNC: 0.73 MG/DL
EGFRCR SERPLBLD CKD-EPI 2021: 88 ML/MIN/1.73M2
GLUCOSE SERPL-MCNC: 68 MG/DL
HDLC SERPL-MCNC: 36 MG/DL
LDLC SERPL-MCNC: 77 MG/DL
NONHDLC SERPL-MCNC: 125 MG/DL
POTASSIUM SERPL-SCNC: 4.5 MMOL/L
PROT SERPL-MCNC: 6.7 G/DL
SODIUM SERPL-SCNC: 141 MMOL/L
TRIGL SERPL-MCNC: 293 MG/DL

## 2025-03-21 PROCEDURE — 36415 COLL VENOUS BLD VENIPUNCTURE: CPT

## 2025-03-21 PROCEDURE — G0447 BEHAVIOR COUNSEL OBESITY 15M: CPT | Mod: 59

## 2025-03-21 PROCEDURE — G0537: CPT

## 2025-03-21 PROCEDURE — 99214 OFFICE O/P EST MOD 30 MIN: CPT

## 2025-03-21 PROCEDURE — G2211 COMPLEX E/M VISIT ADD ON: CPT

## 2025-03-21 RX ORDER — GUAIFENESIN 600 MG/1
600 TABLET, EXTENDED RELEASE ORAL TWICE DAILY
Qty: 28 | Refills: 0 | Status: ACTIVE | COMMUNITY
Start: 2025-03-21 | End: 1900-01-01

## 2025-03-22 ENCOUNTER — NON-APPOINTMENT (OUTPATIENT)
Age: 70
End: 2025-03-22

## 2025-03-26 ENCOUNTER — NON-APPOINTMENT (OUTPATIENT)
Age: 70
End: 2025-03-26

## 2025-03-26 DIAGNOSIS — E78.1 PURE HYPERGLYCERIDEMIA: ICD-10-CM

## 2025-03-26 RX ORDER — FENOFIBRATE 145 MG/1
145 TABLET, COATED ORAL
Qty: 90 | Refills: 0 | Status: ACTIVE | COMMUNITY
Start: 2025-03-26 | End: 1900-01-01

## 2025-04-03 ENCOUNTER — APPOINTMENT (OUTPATIENT)
Dept: CARDIOLOGY | Facility: CLINIC | Age: 70
End: 2025-04-03
Payer: MEDICARE

## 2025-04-03 ENCOUNTER — NON-APPOINTMENT (OUTPATIENT)
Age: 70
End: 2025-04-03

## 2025-04-03 VITALS
WEIGHT: 138 LBS | SYSTOLIC BLOOD PRESSURE: 106 MMHG | DIASTOLIC BLOOD PRESSURE: 66 MMHG | HEART RATE: 71 BPM | BODY MASS INDEX: 25.4 KG/M2 | HEIGHT: 62 IN

## 2025-04-03 DIAGNOSIS — M05.749 RHEUMATOID ARTHRITIS WITH RHEUMATOID FACTOR OF UNSPECIFIED HAND W/OUT ORGAN OR SYSTEMS INVOLVEMENT: ICD-10-CM

## 2025-04-03 DIAGNOSIS — I25.10 ATHEROSCLEROTIC HEART DISEASE OF NATIVE CORONARY ARTERY W/OUT ANGINA PECTORIS: ICD-10-CM

## 2025-04-03 DIAGNOSIS — I35.1 NONRHEUMATIC AORTIC (VALVE) INSUFFICIENCY: ICD-10-CM

## 2025-04-03 DIAGNOSIS — E78.5 HYPERLIPIDEMIA, UNSPECIFIED: ICD-10-CM

## 2025-04-03 PROCEDURE — 93000 ELECTROCARDIOGRAM COMPLETE: CPT

## 2025-04-03 PROCEDURE — 99214 OFFICE O/P EST MOD 30 MIN: CPT

## 2025-04-03 RX ORDER — HYDROXYCHLOROQUINE SULFATE 200 MG/1
200 TABLET, FILM COATED ORAL TWICE DAILY
Qty: 60 | Refills: 0 | Status: ACTIVE | COMMUNITY
Start: 2025-04-03

## 2025-04-28 NOTE — ED ADULT NURSE NOTE - DISCHARGE DATE/TIME
Pt admitted for evaluation of RUQ and epigastric pain. Pt HDS and afebrile. UA concerning for UTI, UC pending. GI consulted for persistent RUQ and epigastric abdominal pain. CT a/p c/f ARPITA stenosis and confirmed on mesenteric ischemia US>>No evidence of hemodynamically significant stenosis in the celiac or SMA.  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 Bui w/ vascular surgery who does not recommend any intervention at this time and no needed surveillance. Pt had EGD per GI that was unremarkable. Pt tolerated oral diet following scope. Abd pain improved w/ bentyl. Given analgesics at discharge. Pt med rec'd and medications were delivered to bedside prior to discharge. Pt medically ready for discharge home. Pt educated on hospital course and plan, verbally agrees and understands. All questions answered.     Physical Exam  Gen: in NAD, appears stated age  Neuro: AAOx3, motor, sensory, and strength grossly intact BL  HEENT: EOMI, PERRLA; no JVD appreciated  CVS: RRR, no m/r/g  Resp: lungs CTAB, no w/r/r; no belabored breathing or accessory muscle use appreciated   Abd: NTND, soft to palpation  Extrem: no UE or LE edema BL    11-Apr-2022 06:12

## 2025-04-29 ENCOUNTER — APPOINTMENT (OUTPATIENT)
Dept: INTERNAL MEDICINE | Facility: CLINIC | Age: 70
End: 2025-04-29
Payer: MEDICARE

## 2025-04-29 VITALS
BODY MASS INDEX: 24.29 KG/M2 | HEART RATE: 73 BPM | DIASTOLIC BLOOD PRESSURE: 67 MMHG | WEIGHT: 132 LBS | HEIGHT: 62 IN | SYSTOLIC BLOOD PRESSURE: 97 MMHG | OXYGEN SATURATION: 73 %

## 2025-04-29 DIAGNOSIS — K12.0 RECURRENT ORAL APHTHAE: ICD-10-CM

## 2025-04-29 PROCEDURE — 99214 OFFICE O/P EST MOD 30 MIN: CPT

## 2025-04-29 PROCEDURE — G2211 COMPLEX E/M VISIT ADD ON: CPT

## 2025-04-30 RX ORDER — LIDOCAINE HYDROCHLORIDE 20 MG/ML
2 SOLUTION ORAL; TOPICAL
Qty: 1 | Refills: 6 | Status: ACTIVE | COMMUNITY
Start: 2025-04-29 | End: 1900-01-01

## 2025-06-17 ENCOUNTER — APPOINTMENT (OUTPATIENT)
Dept: INTERNAL MEDICINE | Facility: CLINIC | Age: 70
End: 2025-06-17
Payer: MEDICARE

## 2025-06-17 VITALS
WEIGHT: 126 LBS | BODY MASS INDEX: 23.19 KG/M2 | TEMPERATURE: 97.3 F | DIASTOLIC BLOOD PRESSURE: 71 MMHG | SYSTOLIC BLOOD PRESSURE: 104 MMHG | HEIGHT: 62 IN | HEART RATE: 76 BPM

## 2025-06-17 PROBLEM — R42 DIZZINESS: Status: ACTIVE | Noted: 2025-06-17

## 2025-06-17 PROCEDURE — 99214 OFFICE O/P EST MOD 30 MIN: CPT

## 2025-06-17 PROCEDURE — G2211 COMPLEX E/M VISIT ADD ON: CPT

## 2025-06-17 PROCEDURE — 36415 COLL VENOUS BLD VENIPUNCTURE: CPT

## 2025-06-17 RX ORDER — MECLIZINE 25 MG/1
25 TABLET, CHEWABLE ORAL EVERY 8 HOURS
Qty: 21 | Refills: 0 | Status: ACTIVE | COMMUNITY
Start: 2025-06-17 | End: 1900-01-01

## 2025-06-17 RX ORDER — LEFLUNOMIDE 20 MG/1
20 TABLET, FILM COATED ORAL
Refills: 0 | Status: ACTIVE | COMMUNITY

## 2025-06-18 ENCOUNTER — NON-APPOINTMENT (OUTPATIENT)
Age: 70
End: 2025-06-18

## 2025-06-18 LAB
ALBUMIN SERPL ELPH-MCNC: 4.2 G/DL
ALP BLD-CCNC: 70 U/L
ALT SERPL-CCNC: 28 U/L
ANION GAP SERPL CALC-SCNC: 12 MMOL/L
AST SERPL-CCNC: 35 U/L
BASOPHILS # BLD AUTO: 0.02 K/UL
BASOPHILS NFR BLD AUTO: 0.5 %
BILIRUB SERPL-MCNC: 0.5 MG/DL
BUN SERPL-MCNC: 19 MG/DL
CALCIUM SERPL-MCNC: 9.5 MG/DL
CHLORIDE SERPL-SCNC: 106 MMOL/L
CHOLEST SERPL-MCNC: 180 MG/DL
CO2 SERPL-SCNC: 22 MMOL/L
CREAT SERPL-MCNC: 0.71 MG/DL
CRP SERPL-MCNC: <3 MG/L
EGFRCR SERPLBLD CKD-EPI 2021: 91 ML/MIN/1.73M2
EOSINOPHIL # BLD AUTO: 0.04 K/UL
EOSINOPHIL NFR BLD AUTO: 1.1 %
ERYTHROCYTE [SEDIMENTATION RATE] IN BLOOD BY WESTERGREN METHOD: 25 MM/HR
GLUCOSE SERPL-MCNC: 82 MG/DL
HCT VFR BLD CALC: 40.6 %
HDLC SERPL-MCNC: 54 MG/DL
HGB BLD-MCNC: 13.2 G/DL
IMM GRANULOCYTES NFR BLD AUTO: 0.3 %
LDLC SERPL-MCNC: 116 MG/DL
LYMPHOCYTES # BLD AUTO: 1.18 K/UL
LYMPHOCYTES NFR BLD AUTO: 32.1 %
MAN DIFF?: NORMAL
MCHC RBC-ENTMCNC: 32.1 PG
MCHC RBC-ENTMCNC: 32.5 G/DL
MCV RBC AUTO: 98.8 FL
MONOCYTES # BLD AUTO: 0.4 K/UL
MONOCYTES NFR BLD AUTO: 10.9 %
NEUTROPHILS # BLD AUTO: 2.03 K/UL
NEUTROPHILS NFR BLD AUTO: 55.1 %
NONHDLC SERPL-MCNC: 127 MG/DL
PLATELET # BLD AUTO: 204 K/UL
POTASSIUM SERPL-SCNC: 4.4 MMOL/L
PROT SERPL-MCNC: 6.8 G/DL
RBC # BLD: 4.11 M/UL
RBC # FLD: 13.2 %
SODIUM SERPL-SCNC: 141 MMOL/L
TRIGL SERPL-MCNC: 56 MG/DL
WBC # FLD AUTO: 3.68 K/UL

## 2025-06-18 RX ORDER — IBUPROFEN 200 MG/1
6-10 TABLET, COATED ORAL
Qty: 1 | Refills: 0 | Status: ACTIVE | COMMUNITY
Start: 2025-06-18 | End: 1900-01-01

## 2025-06-19 ENCOUNTER — NON-APPOINTMENT (OUTPATIENT)
Age: 70
End: 2025-06-19

## 2025-06-19 ENCOUNTER — APPOINTMENT (OUTPATIENT)
Dept: INTERNAL MEDICINE | Facility: CLINIC | Age: 70
End: 2025-06-19

## 2025-06-23 ENCOUNTER — APPOINTMENT (OUTPATIENT)
Dept: CARDIOLOGY | Facility: CLINIC | Age: 70
End: 2025-06-23
Payer: MEDICARE

## 2025-06-23 VITALS — SYSTOLIC BLOOD PRESSURE: 90 MMHG | DIASTOLIC BLOOD PRESSURE: 70 MMHG

## 2025-06-23 VITALS
HEART RATE: 80 BPM | OXYGEN SATURATION: 98 % | SYSTOLIC BLOOD PRESSURE: 110 MMHG | WEIGHT: 126 LBS | BODY MASS INDEX: 23.19 KG/M2 | HEIGHT: 62 IN | TEMPERATURE: 97.1 F | DIASTOLIC BLOOD PRESSURE: 70 MMHG

## 2025-06-23 PROBLEM — R42 ORTHOSTATIC DIZZINESS: Status: ACTIVE | Noted: 2025-06-23

## 2025-06-23 PROCEDURE — 99214 OFFICE O/P EST MOD 30 MIN: CPT

## 2025-06-27 ENCOUNTER — RX RENEWAL (OUTPATIENT)
Age: 70
End: 2025-06-27

## 2025-08-19 ENCOUNTER — APPOINTMENT (OUTPATIENT)
Dept: INTERNAL MEDICINE | Facility: CLINIC | Age: 70
End: 2025-08-19
Payer: MEDICARE

## 2025-08-19 VITALS
HEIGHT: 62 IN | BODY MASS INDEX: 23.19 KG/M2 | TEMPERATURE: 98 F | SYSTOLIC BLOOD PRESSURE: 114 MMHG | OXYGEN SATURATION: 99 % | HEART RATE: 75 BPM | WEIGHT: 126 LBS | DIASTOLIC BLOOD PRESSURE: 76 MMHG

## 2025-08-19 DIAGNOSIS — J02.9 ACUTE PHARYNGITIS, UNSPECIFIED: ICD-10-CM

## 2025-08-19 DIAGNOSIS — R52 PAIN, UNSPECIFIED: ICD-10-CM

## 2025-08-19 PROCEDURE — 87811 SARS-COV-2 COVID19 W/OPTIC: CPT | Mod: QW

## 2025-08-19 PROCEDURE — 87804 INFLUENZA ASSAY W/OPTIC: CPT | Mod: QW

## 2025-08-19 PROCEDURE — 99214 OFFICE O/P EST MOD 30 MIN: CPT

## 2025-08-19 PROCEDURE — G2211 COMPLEX E/M VISIT ADD ON: CPT

## 2025-08-19 PROCEDURE — 87880 STREP A ASSAY W/OPTIC: CPT | Mod: QW

## 2025-08-19 RX ORDER — AZITHROMYCIN 250 MG/1
250 TABLET, FILM COATED ORAL
Qty: 1 | Refills: 0 | Status: ACTIVE | COMMUNITY
Start: 2025-08-19 | End: 1900-01-01

## 2025-08-20 ENCOUNTER — NON-APPOINTMENT (OUTPATIENT)
Age: 70
End: 2025-08-20

## 2025-08-20 LAB
INFLUENZA A RESULT: NOT DETECTED
INFLUENZA B RESULT: NOT DETECTED
RESP SYN VIRUS RESULT: NOT DETECTED
SARS-COV-2 RESULT: DETECTED

## 2025-08-21 ENCOUNTER — NON-APPOINTMENT (OUTPATIENT)
Age: 70
End: 2025-08-21

## 2025-08-21 LAB — BACTERIA THROAT CULT: NORMAL
